# Patient Record
Sex: MALE | Race: BLACK OR AFRICAN AMERICAN | Employment: UNEMPLOYED | URBAN - METROPOLITAN AREA
[De-identification: names, ages, dates, MRNs, and addresses within clinical notes are randomized per-mention and may not be internally consistent; named-entity substitution may affect disease eponyms.]

---

## 2020-12-07 ENCOUNTER — HOSPITAL ENCOUNTER (INPATIENT)
Age: 25
LOS: 13 days | Discharge: HOME | DRG: 885 | End: 2020-12-21
Attending: INTERNAL MEDICINE | Admitting: PSYCHIATRY & NEUROLOGY

## 2020-12-07 DIAGNOSIS — F20.9 SCHIZOPHRENIA, UNSPECIFIED TYPE (HCC): Primary | ICD-10-CM

## 2020-12-07 LAB
ALBUMIN SERPL-MCNC: 3.4 G/DL (ref 3.5–5)
ALBUMIN/GLOB SERPL: 0.9 {RATIO} (ref 1.1–2.2)
ALP SERPL-CCNC: 93 U/L (ref 45–117)
ALT SERPL-CCNC: 24 U/L (ref 12–78)
ANION GAP SERPL CALC-SCNC: 5 MMOL/L (ref 5–15)
AST SERPL W P-5'-P-CCNC: 20 U/L (ref 15–37)
BASOPHILS # BLD: 0 K/UL (ref 0–0.1)
BASOPHILS NFR BLD: 1 % (ref 0–1)
BILIRUB SERPL-MCNC: 0.3 MG/DL (ref 0.2–1)
BUN SERPL-MCNC: 13 MG/DL (ref 6–20)
BUN/CREAT SERPL: 13 (ref 12–20)
CA-I BLD-MCNC: 8.7 MG/DL (ref 8.5–10.1)
CHLORIDE SERPL-SCNC: 109 MMOL/L (ref 97–108)
CO2 SERPL-SCNC: 28 MMOL/L (ref 21–32)
CREAT SERPL-MCNC: 0.98 MG/DL (ref 0.7–1.3)
DATE LAST DOSE: ABNORMAL
DIFFERENTIAL METHOD BLD: ABNORMAL
EOSINOPHIL # BLD: 0.2 K/UL (ref 0–0.4)
EOSINOPHIL NFR BLD: 4 % (ref 0–7)
ERYTHROCYTE [DISTWIDTH] IN BLOOD BY AUTOMATED COUNT: 11.8 % (ref 11.5–14.5)
GLOBULIN SER CALC-MCNC: 3.7 G/DL (ref 2–4)
GLUCOSE SERPL-MCNC: 106 MG/DL (ref 65–100)
HCT VFR BLD AUTO: 38.3 % (ref 36.6–50.3)
HGB BLD-MCNC: 12.8 G/DL (ref 12.1–17)
IMM GRANULOCYTES # BLD AUTO: 0 K/UL (ref 0–0.04)
IMM GRANULOCYTES NFR BLD AUTO: 0 % (ref 0–0.5)
LYMPHOCYTES # BLD: 2 K/UL (ref 0.8–3.5)
LYMPHOCYTES NFR BLD: 40 % (ref 12–49)
MCH RBC QN AUTO: 31 PG (ref 26–34)
MCHC RBC AUTO-ENTMCNC: 33.4 G/DL (ref 30–36.5)
MCV RBC AUTO: 92.7 FL (ref 80–99)
MONOCYTES # BLD: 0.2 K/UL (ref 0–1)
MONOCYTES NFR BLD: 3 % (ref 5–13)
NEUTS SEG # BLD: 2.6 K/UL (ref 1.8–8)
NEUTS SEG NFR BLD: 52 % (ref 32–75)
PLATELET # BLD AUTO: 291 K/UL (ref 150–400)
PMV BLD AUTO: 10.3 FL (ref 8.9–12.9)
POTASSIUM SERPL-SCNC: 3.8 MMOL/L (ref 3.5–5.1)
PROT SERPL-MCNC: 7.1 G/DL (ref 6.4–8.2)
RBC # BLD AUTO: 4.13 M/UL (ref 4.1–5.7)
REPORTED DOSE,DOSE: ABNORMAL UNITS
SALICYLATES SERPL-MCNC: 2.4 MG/DL (ref 2.8–20)
SODIUM SERPL-SCNC: 142 MMOL/L (ref 136–145)
WBC # BLD AUTO: 4.9 K/UL (ref 4.1–11.1)

## 2020-12-07 PROCEDURE — 36415 COLL VENOUS BLD VENIPUNCTURE: CPT

## 2020-12-07 PROCEDURE — 99285 EMERGENCY DEPT VISIT HI MDM: CPT

## 2020-12-07 PROCEDURE — 80053 COMPREHEN METABOLIC PANEL: CPT

## 2020-12-07 PROCEDURE — 80307 DRUG TEST PRSMV CHEM ANLYZR: CPT

## 2020-12-07 PROCEDURE — 85025 COMPLETE CBC W/AUTO DIFF WBC: CPT

## 2020-12-07 RX ORDER — RISPERIDONE 0.25 MG/1
TABLET, FILM COATED ORAL
COMMUNITY
End: 2020-12-21

## 2020-12-07 NOTE — ED PROVIDER NOTES
EMERGENCY DEPARTMENT HISTORY AND PHYSICAL EXAM      Date: 12/7/2020  Patient Name: Bryant Sykes    History of Presenting Illness     Chief Complaint   Patient presents with    Mental Health Problem       History Provided By: Patient    HPI: Bryant Sykes, 31 y.o. male with a past medical history significant for schizophrenia and autism who presents to the ED via police for a mental health evaluation. Patient is homeless and was asleep on the streets and a  picked him up and brought him to the ED for mental health evaluation. Patient states that he is originally from [facility] and has been traveling with the goal of getting to [place] to find a job in [employer]. He takes Risperdal IM as well as p.o. and states he has been compliant with IM but not the p.o. medication since he left [place]. Currently with no complaints. Denies any SI, HI, depression, anxiety, chest pain, shortness of breath, fever, chills, nausea, vomiting or diarrhea. There are no other complaints, changes, or physical findings at this time. PCP: None    No current facility-administered medications on file prior to encounter. Current Outpatient Medications on File Prior to Encounter   Medication Sig Dispense Refill    risperiDONE (RisperDAL) 0.25 mg tablet Take  by mouth. Past History     Past Medical History:  No past medical history on file. Past Surgical History:  No past surgical history on file. Family History:  No family history on file. Social History:  Social History     Tobacco Use    Smoking status: Current Every Day Smoker    Smokeless tobacco: Never Used   Substance Use Topics    Alcohol use: Not on file    Drug use: Not on file       Allergies:  No Known Allergies      Review of Systems     Review of Systems   Constitutional: Negative for chills, fatigue and fever. HENT: Negative.     Respiratory: Negative for cough, chest tightness, shortness of breath and wheezing. Cardiovascular: Negative for chest pain and palpitations. Gastrointestinal: Negative for abdominal pain, diarrhea, nausea and vomiting. Genitourinary: Negative for frequency and urgency. Musculoskeletal: Negative for back pain, neck pain and neck stiffness. Skin: Negative for rash. Neurological: Negative for dizziness, weakness, light-headedness and headaches. Psychiatric/Behavioral: Negative. Negative for behavioral problems, confusion, self-injury, sleep disturbance and suicidal ideas. The patient is not nervous/anxious. All other systems reviewed and are negative. Physical Exam     Physical Exam  Vitals signs and nursing note reviewed. Constitutional:       General: He is not in acute distress. Appearance: Normal appearance. He is well-developed. He is not ill-appearing, toxic-appearing or diaphoretic. HENT:      Head: Normocephalic and atraumatic. Nose: Nose normal. No congestion or rhinorrhea. Mouth/Throat:      Mouth: Mucous membranes are moist.      Pharynx: Oropharynx is clear. No oropharyngeal exudate or posterior oropharyngeal erythema. Eyes:      General: No scleral icterus. Conjunctiva/sclera: Conjunctivae normal.      Pupils: Pupils are equal, round, and reactive to light. Neck:      Musculoskeletal: Normal range of motion and neck supple. No neck rigidity or muscular tenderness. Cardiovascular:      Rate and Rhythm: Normal rate and regular rhythm. Pulses:           Radial pulses are 2+ on the right side and 2+ on the left side. Dorsalis pedis pulses are 2+ on the right side and 2+ on the left side. Heart sounds: No murmur. No friction rub. No gallop. Pulmonary:      Effort: Pulmonary effort is normal. No tachypnea, accessory muscle usage, respiratory distress or retractions. Breath sounds: Normal breath sounds. No stridor. No decreased breath sounds, wheezing, rhonchi or rales.    Chest:      Chest wall: No tenderness. Abdominal:      General: Bowel sounds are normal. There is no distension. Palpations: Abdomen is soft. There is no mass. Tenderness: There is no abdominal tenderness. There is no right CVA tenderness, left CVA tenderness, guarding or rebound. Musculoskeletal: Normal range of motion. General: No deformity. Right lower leg: No edema. Left lower leg: No edema. Skin:     General: Skin is warm and dry. Capillary Refill: Capillary refill takes less than 2 seconds. Coloration: Skin is not jaundiced or pale. Findings: No bruising, erythema or rash. Neurological:      General: No focal deficit present. Mental Status: He is alert and oriented to person, place, and time. Mental status is at baseline. Sensory: Sensation is intact. Motor: Motor function is intact. Psychiatric:         Mood and Affect: Affect is flat. Behavior: Behavior is withdrawn. Behavior is cooperative. Thought Content: Thought content is not paranoid or delusional. Thought content does not include homicidal or suicidal ideation. Thought content does not include homicidal or suicidal plan. Comments: Avoids eye contact         Lab and Diagnostic Study Results     Labs -   No results found for this or any previous visit (from the past 12 hour(s)). Radiologic Studies - none    Medical Decision Making   - I am the first provider for this patient. - I reviewed the vital signs, available nursing notes, past medical history, past surgical history, family history and social history. - Initial assessment performed. The patients presenting problems have been discussed, and they are in agreement with the care plan formulated and outlined with them. I have encouraged them to ask questions as they arise throughout their visit. Vital Signs-Reviewed the patient's vital signs.   Patient Vitals for the past 12 hrs:   Temp Pulse Resp BP SpO2   12/07/20 1233 98.6 °F (37 °C) 63 16 (!) 144/94 98 %       Records Reviewed: Nursing Notes    The patient presents with mental health evaluation with a differential diagnosis of homelessness, schizophrenia, medication noncompliance      ED Course:     ED Course as of Dec 07 1553   Mon Dec 07, 2020   1549 Mental health is seen and evaluated patient. Determined that patient is exhibiting very bizarre behavior and he will be admitted for evaluation of his schizophrenia. [NO]      ED Course User Index  [NO] Xuan Galvan PA       Provider Notes (Medical Decision Making):     MDM  Number of Diagnoses or Management Options  Schizophrenia, unspecified type [facility]):   Diagnosis management comments:     58-year-old male brought in by police for mental health evaluation. Patient has no specific complaints. However, throughout ED course he exhibits very bizarre behavior and thought content. Behavioral health evaluated patient and stated patient meets criteria for admission for his schizophrenia. Patient agreeable with plan of care       Amount and/or Complexity of Data Reviewed  Clinical lab tests: ordered and reviewed  Review and summarize past medical records: yes  Discuss the patient with other providers: yes    Patient Progress  Patient progress: stable             Disposition   Disposition: Admitted to [address] [place] [address] at [facility]           Diagnosis     Clinical Impression:   1. Schizophrenia, unspecified type [facility])        Attestations:    THOMAS Drake    Please note that this dictation was completed with [employer], the computer voice recognition software. Quite often unanticipated grammatical, syntax, homophones, and other interpretive errors are inadvertently transcribed by the computer software. Please disregard these errors. Please excuse any errors that have escaped final proofreading. Thank you.
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
## 2020-12-07 NOTE — BSMART NOTE
Pt assessed face to face in ED #16. Pt presented to ED after [employer] dropped him off. Pt presents in green gown, malodorous. Pt faced away from writer during assessment, did not make any eye contact. Writer observed Pt's eyes as if he was following something around the room although Pt denied VH. Pt was A&Ox2. Pt's speech was slow, clear. Pt's thought content was bizarre w/ delusions. Pt denied SI. HI. Pt said \"[place]\" to writer when writer entered room and writer told Pt it wasn't Abel yes but Pt responded that [facility] day is [place] for him. Pt reports the police dropped him off for a check up. Pt reports the Police were concerned because he had been sleeping to [facility]. Pt is homeless and transient from [place]. Pt reported he was travelling to [place] to other staff but when writer asked where he was going he said [place] and only verified he was going to [place] after he was asked about previous statements. Pt reports he has been \"sleeping a long time\". Pt reports a hx of schizophrenia, reports hx of taking Risperdal. Pt presented endorsed AH saying he always hears the voices. Pt denies the voices are command in nature and when asked to describe them Pt presented as bizarre and described them as \"like the rapture\" or \"gone in 61 seconds\". Pt reported decrease in appetite. Pt denies substance use. Pt endorsed racing thoughts. Pt reports he believes he was \"reincarnated\" after balking out after smoking K2 a few years ago. Pt could verify his , age was off by a year, he knew he was in a hospital in [place] and reported that the year was . Pt reports his last [facility] admission was a couple years ago. Pt has no current OP tx. Pt denies any support system and stated \"I always walk out\" and that \"life is boring\" because there are \"no neat drugs around\". At this time Pt reports he is vol for tx.     Writer consulted w/ Miles Hui who recommends [facility] admission due to Pt presenting as bizarre w/ delusions, disoriented and belief that Pt is unable to keep himself safe at this time due to [address]. Pt accepted to 2S pending medical clearance / bed availability. ED notified. Covid test needed.

## 2020-12-07 NOTE — ED NOTES
Pt AOx2. States he knows hes in a hospital but was unsure of the name. Pt states that he is from [place], [place]. And he is on his way to [place] to find work. Pt denies SI/HI. Pt states that he does hear voices. Pt states that his mom passed away not to long ago. Pt states that he hears loud voices all the time but they are not telling him to do anything. Pt appeared confused when asked if he was on any medications. Pt stated that he \"feels like he \". When I asked the pt to explain what he meant, he stated \"you know, Im just trying to live you know? \" Pt stated that he was sleeping and the police were called because he wanted to get further to [place]. Pt was placed in a green gown, personal items were removed. No loose wires in room. Trash bins removed. Cabinets were zip tied. Pt was given a lunch tray. Will cont to monitor pt at this time.  was notified for evaluation.

## 2020-12-08 PROBLEM — F20.9 SCHIZOPHRENIA (HCC): Status: ACTIVE | Noted: 2020-12-08

## 2020-12-08 LAB
AMPHET UR QL SCN: NEGATIVE
APAP SERPL-MCNC: <10 UG/ML (ref 10–30)
APPEARANCE UR: CLEAR
BACTERIA URNS QL MICRO: NEGATIVE /HPF
BARBITURATES UR QL SCN: NEGATIVE
BENZODIAZ UR QL: NEGATIVE
BILIRUB UR QL: NEGATIVE
CANNABINOIDS UR QL SCN: NEGATIVE
CAOX CRY URNS QL MICRO: NORMAL
COCAINE UR QL SCN: NEGATIVE
COLOR UR: NORMAL
DRUG SCRN COMMENT,DRGCM: NORMAL
GLUCOSE UR STRIP.AUTO-MCNC: NEGATIVE MG/DL
HGB UR QL STRIP: NEGATIVE
KETONES UR QL STRIP.AUTO: NEGATIVE MG/DL
LEUKOCYTE ESTERASE UR QL STRIP.AUTO: NEGATIVE
METHADONE UR QL: NEGATIVE
MUCOUS THREADS URNS QL MICRO: NORMAL /LPF
NITRITE UR QL STRIP.AUTO: NEGATIVE
OPIATES UR QL: NEGATIVE
PCP UR QL: NEGATIVE
PH UR STRIP: 5 [PH] (ref 5–8)
PROT UR STRIP-MCNC: NEGATIVE MG/DL
RBC #/AREA URNS HPF: NORMAL /HPF (ref 0–5)
SARS-COV-2, COV2: NORMAL
SARS-COV-2, COV2: NOT DETECTED
SP GR UR REFRACTOMETRY: 1.03 (ref 1–1.03)
SPECIMEN SOURCE: NORMAL
UA: UC IF INDICATED,UAUC: NORMAL
UROBILINOGEN UR QL STRIP.AUTO: 0.1 EU/DL (ref 0.1–1)
WBC URNS QL MICRO: NORMAL /HPF (ref 0–4)

## 2020-12-08 PROCEDURE — 87635 SARS-COV-2 COVID-19 AMP PRB: CPT

## 2020-12-08 PROCEDURE — 80307 DRUG TEST PRSMV CHEM ANLYZR: CPT

## 2020-12-08 PROCEDURE — 65220000003 HC RM SEMIPRIVATE PSYCH

## 2020-12-08 PROCEDURE — 36415 COLL VENOUS BLD VENIPUNCTURE: CPT

## 2020-12-08 PROCEDURE — 81001 URINALYSIS AUTO W/SCOPE: CPT

## 2020-12-08 PROCEDURE — 74011250637 HC RX REV CODE- 250/637: Performed by: PSYCHIATRY & NEUROLOGY

## 2020-12-08 RX ORDER — DIPHENHYDRAMINE HYDROCHLORIDE 50 MG/ML
50 INJECTION, SOLUTION INTRAMUSCULAR; INTRAVENOUS
Status: DISCONTINUED | OUTPATIENT
Start: 2020-12-08 | End: 2020-12-21 | Stop reason: HOSPADM

## 2020-12-08 RX ORDER — BENZTROPINE MESYLATE 1 MG/1
1 TABLET ORAL
Status: DISCONTINUED | OUTPATIENT
Start: 2020-12-08 | End: 2020-12-21 | Stop reason: HOSPADM

## 2020-12-08 RX ORDER — HYDROXYZINE 50 MG/1
50 TABLET, FILM COATED ORAL
Status: DISCONTINUED | OUTPATIENT
Start: 2020-12-08 | End: 2020-12-21 | Stop reason: HOSPADM

## 2020-12-08 RX ORDER — RISPERIDONE 2 MG/1
2 TABLET, FILM COATED ORAL
Status: DISCONTINUED | OUTPATIENT
Start: 2020-12-08 | End: 2020-12-21 | Stop reason: HOSPADM

## 2020-12-08 RX ORDER — OLANZAPINE 5 MG/1
5 TABLET ORAL
Status: DISCONTINUED | OUTPATIENT
Start: 2020-12-08 | End: 2020-12-21 | Stop reason: HOSPADM

## 2020-12-08 RX ORDER — ACETAMINOPHEN 500 MG
500 TABLET ORAL
Status: DISCONTINUED | OUTPATIENT
Start: 2020-12-08 | End: 2020-12-09

## 2020-12-08 RX ORDER — HYDROXYZINE PAMOATE 25 MG/1
25 CAPSULE ORAL
Status: DISCONTINUED | OUTPATIENT
Start: 2020-12-08 | End: 2020-12-21 | Stop reason: HOSPADM

## 2020-12-08 RX ORDER — HALOPERIDOL 5 MG/ML
5 INJECTION INTRAMUSCULAR
Status: DISCONTINUED | OUTPATIENT
Start: 2020-12-08 | End: 2020-12-21 | Stop reason: HOSPADM

## 2020-12-08 RX ORDER — TRAZODONE HYDROCHLORIDE 50 MG/1
50 TABLET ORAL
Status: DISCONTINUED | OUTPATIENT
Start: 2020-12-08 | End: 2020-12-21 | Stop reason: HOSPADM

## 2020-12-08 RX ORDER — ACETAMINOPHEN 325 MG/1
650 TABLET ORAL
Status: DISCONTINUED | OUTPATIENT
Start: 2020-12-08 | End: 2020-12-21 | Stop reason: HOSPADM

## 2020-12-08 RX ORDER — LORAZEPAM 2 MG/ML
1 INJECTION INTRAMUSCULAR
Status: DISCONTINUED | OUTPATIENT
Start: 2020-12-08 | End: 2020-12-21 | Stop reason: HOSPADM

## 2020-12-08 RX ORDER — ADHESIVE BANDAGE
30 BANDAGE TOPICAL DAILY PRN
Status: DISCONTINUED | OUTPATIENT
Start: 2020-12-08 | End: 2020-12-21 | Stop reason: HOSPADM

## 2020-12-08 RX ADMIN — TRAZODONE HYDROCHLORIDE 50 MG: 50 TABLET ORAL at 22:00

## 2020-12-08 RX ADMIN — RISPERIDONE 2 MG: 2 TABLET ORAL at 22:00

## 2020-12-08 NOTE — ROUTINE PROCESS
TRANSFER - OUT REPORT:    Verbal report given to Rom Shirley on Eduar Gallagher  being transferred to (unit) for routine progression of care       Report consisted of patients Situation, Background, Assessment and   Recommendations(SBAR). Information from the following report(s) SBAR, Kardex, ED Summary, Procedure Summary, Intake/Output, Accordion and Recent Results was reviewed with the receiving nurse. Lines:       Opportunity for questions and clarification was provided.       Patient transported with:   Mobile Event Guide

## 2020-12-08 NOTE — BSMART NOTE
Pt was seen laying on the floor with blanket covered up. Pt has not had any behavioral issues on shift. Pt has been called and cooperative with staff. Awaiting Covid results for [facility] admission.

## 2020-12-08 NOTE — BSMART NOTE
Writer checked in on Pt in ED #23. Pt was lying in bed. Pt said he slept okay last night. Pt deneid SI, AVH. Pt voiced no complaints at this time. Pt was pre-screened by D19 last night but will remain voluntary.  Pt waiting transfer to  pending covid test.

## 2020-12-08 NOTE — ED NOTES
Patient left room, walking towards waiting room doors. Asked patient where he was going, and patient states \"to smoke a cigarette\". Advised patient that smoking is not allowed on the property. Patient states \"Oh, okay\" and is easily redirectable back to his room. Asked patient to remove his clothing from under his gown due to our policies. Patient states \"No, I'm okay. I am cold. \"  Told patient that he has a gown on that would be covering and offered him warm blankets; patient still refused. Asked patient to pull his pockets out of his shorts to show if anything in pockets. Patient complied and nothing is in his pockets. Also advised patient that a urine specimen and COVID swab is needed for further lab work for admission. Patient states, \"No, I want to go. I want to leave. She said she would be coming back from [facility] \"  Patient also mumbling words that is unable to be understood. Patient very cooperative and pleasant. Notified Fletcher Root.

## 2020-12-08 NOTE — BH NOTES
Patient arrived on floor. Patient was irritated. Patient was asked to do a safety search per unit policy. Patient refused search. Patient became irritated. Code SREEDHAR was called at 08 [phone]. Patient complied with search when security and additional staff arrived. Will continue to be available.

## 2020-12-08 NOTE — BSMART NOTE
Writer was contacted by Tejas Stevenson and notified that pt was trying to leave the ER. She reported that she redirected pt back to the Room. She reported that pt is still asking if he can go home. Writer went and spoke with pt. Writer asked pt why he was asking to leave. Pt shared that he does not want to be hospitalized reporting that he only wanted to get medication and leave. Writer explained to pt that the psychiatrist reported concerns about his mental health and was asking that pt stay and get IP treatment due to safety concerns. Writer reported that if pt wants to leave, then D19 will be notified to evaluate further due to the psychiatrist recommending IP treatment. Pt agreed to talk to D19.

## 2020-12-08 NOTE — BSMART NOTE
Pt Update. Writer notified by ED that PT was requesting to leave. Writer spoke w/ Pt. Pt presenting w/ delusions and bizarre thought content. Pt said that he's \"Not a  flying above the world\". Pt stated at one point that he wanted Risperdal and then a few minutes later Pt said he would rather drink \"swamp water\" and that \"this earth isn't for him\". Writer offered Pt puzzles, word searches, paper and pencil to occupy his time which Pt refused and said he likes to be outside. Pt left his ED room and Rad Cheng was called. Pt willingly walked back to sit in chair outside his room. Pt presents as confused and continues to ask writer the same question over and over again, forgetting what was just said to him. Pt asking for discharge, Writer explained to Pt multiple times he cannot leave ED at this time. Writer contacted D19 requesting TDO due to Pt asking to leave and attempting to. Pt cannot tell writer or staff today date or what year it is. Pt said he \"doesn't keep up w/ stuff like that\" and only repeated todays year w/ looking at his wrist band. Pt then reported he is a time traveler and trying to get to 2012 which he said hasn't happened yet. Pt endorsed AH saying voices tell him things such as \"just go for it\" and \"go jump off a jp\".

## 2020-12-08 NOTE — ED NOTES
Pt came to nurse's desk stating he \"felt better\" and was \"ready to leave. \" This RN tried to redirect Pt to room but Pt said the \"room is hot. \" Pt started wandering near registration desk. Pt was delusional thoughts such as stating \"I want the ocean to adopt me. \" This RN called Code SREEDHAR. Security was able to redirect Pt to bedroom and to sit in chair. Will continue to monitor Pt.

## 2020-12-09 PROCEDURE — 65220000003 HC RM SEMIPRIVATE PSYCH

## 2020-12-09 PROCEDURE — 74011250637 HC RX REV CODE- 250/637: Performed by: PSYCHIATRY & NEUROLOGY

## 2020-12-09 RX ADMIN — RISPERIDONE 2 MG: 2 TABLET ORAL at 21:01

## 2020-12-09 RX ADMIN — TRAZODONE HYDROCHLORIDE 50 MG: 50 TABLET ORAL at 21:02

## 2020-12-09 NOTE — GROUP NOTE
[facility] GROUP DOCUMENTATION INDIVIDUAL Group Therapy Note Date: 12/9/2020 Group Start Time: 1100 Group End Time: 3167 Group Topic: Education Group - Inpatient [facility] 
 
[facility] GROUP DOCUMENTATION GROUP Group Therapy Note Attendees: Patients used time to create their own safety plan. Patients encouraged to brain storm warning signs, coping strategies, safe places, and safe people in their life together. Patients encouraged to support each other, share about their coping strategies in particular. Patients encouraged to discuss how they can use their safety plans to be effective. Attendance: Did not attend Patient's Goal:  n/a Interventions/techniques: n/a Follows Directions: n/a Interactions: n/a Mental Status: n/a Behavior/appearance: n/a 
 
Goals Achieved: n/a Additional Notes:  Pt encouraged but did not attend group. Lucille Boone

## 2020-12-09 NOTE — GROUP NOTE
[facility] GROUP DOCUMENTATION INDIVIDUAL Group Therapy Note Date: 12/9/2020 Group Start Time: 1300 Group End Time: 3614 Group Topic: Process Group - Inpatient [facility] Pratik WOODY 
 
[facility] GROUP DOCUMENTATION GROUP Group Therapy Note Attendees: Patients used group time to discuss their situations, how they came to the hospital, their feelings, and their goals. Patients encouraged to share and give feedback to their peers. Patients used time to process their feelings. Themes surrounding family, trauma, and self care were discussed. Attendance: Did not attend Patient's Goal:  n/a Interventions/techniques: n/a Follows Directions: n/a Interactions: n/a Mental Status: n/a Behavior/appearance: n/a 
 
Goals Achieved: n/a Additional Notes:  Pt encouraged but did not attend group. Brook[place] Rule
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
## 2020-12-09 NOTE — H&P
[address] [facility] HISTORY AND PHYSICAL    Name:  Janie Weaver  MR#:  [id number]  :  1995  ACCOUNT #:  [de-identified]  ADMIT DATE:  2020    DATE SEEN:  2020    This is a 50-year-old, single,  male patient whom I had seen in consultation in ED yesterday on 2020. Once COVID negative, he was transferred to the behavioral health unit. CHIEF COMPLAINT:  Sleeping in the street behind the electric transformer in a sleeping bag. Police asked him, brought him to the hospital.    HISTORY OF PRESENT ILLNESS:  The patient from [place]. He says he was bored. He was traveling, came to this area looking for a job as a . He gives a history of schizophrenia, taking some medication, sounds like Risperdal, which he is not taking. Not seeing anybody. He has been here for a few days. Denies suicidal thought. Hallucinating but there is no command hallucination. Appetite okay. Sleep decreased. No support system in this area. Trying to get disability. PAST PSYCHIATRIC HISTORY:  One hospitalization, I believe, within last year. PAST MEDICAL HISTORY:  Denied any medical history. One time, he said he had in the past frostbite, but did not leave any residual.  He fully understands he can have frostbite, but he says he was in a good sleeping bag. ALLERGIES TO MEDICATIONS:  NONE. CURRENT MEDICATIONS:  None. FAMILY HISTORY:  Unknown. He told in the ED that his parents are living, but when I asked today, they both are . He says he is the youngest of the children. Other family members, siblings can be helpful and supportive. SOCIAL HISTORY:  The patient denies any alcohol abuse, but smokes five cigarettes a day. Denied drug abuse. TRAUMA HISTORY:  None. MENTAL STATUS:  Average height, medium built, fairly nourished male patient. Polite, cooperative, a little bit disheveled, but verbal, articulate.   Very good chronological history. Denies suicidal thought or homicidal thought. Does acknowledge voices. No command hallucination. During the interview, he was jerky, looking around all of a sudden as if he heard or seen. No agitation, no aggression, and he thanked me for seeing and trying to take care of him. Memory recall is fair. Insight limited. Of course, judgment has been poor by putting himself in risky situation. DIAGNOSES:  Schizophrenia, acute exacerbation. Nicotine abuse. DISPOSITION:  The patient needs inpatient level of care, close observation, medical consult, 15-minute checks. Placed him on an antipsychotic medication - Risperdal and leave p.r.n. trazodone, Vistaril, Tylenol, Mylanta. LENGTH OF STAY:  5-7 days. CRITERIA FOR DISCHARGE:  Free of hallucination. Stable neurovegetative functioning, appropriate disposition planning to not put himself at risk. Try to reach out the family if we can, try to see if they can provide assistance and support. Otherwise, he may have to go to a shelter or crisis stabilization.       Shadia Benson MD      [id number]  D:  12/09/2020 12:44  T:  12/09/2020 14:39  JOB #:  [id number]

## 2020-12-09 NOTE — BH NOTES
Release of Information     Pt has signed a release of information for:     [address]: [phone]

## 2020-12-09 NOTE — BH NOTES
Received a call from female who stated she is pt's sister, Jona Nogueira. Sister stated she received a VM from /[facility] stating that pt is here. Sister stated pt has MI and the family has not seen him in over a year. Sister tearful and stated she is happy to know that pt is safe. Sister left rita number for a return call. U/s informed sister without a passcode U/s is unable to verify or deny whether pt is here. Sister left number at [phone].

## 2020-12-09 NOTE — BH NOTES
Patient presents as flat but cooperative and pleasant. He is interested in his hearing on Friday. He has been up socializing with select peers. He makes his needs known. Remains on close observation.

## 2020-12-09 NOTE — H&P
[address] [facility] HISTORY AND PHYSICAL    Name:  Jace Woodall  MR#:  [id number]  :  1995  ACCOUNT #:  [de-identified]  ADMIT DATE:  2020    This is a 79-year-old single  male patient admitted to the [address] [facility] voluntarily from [facility] ED, where he was brought by the [facility] Department sleeping on the street, later converted into a TDO. The patient's case was consulted on the telephone yesterday, and this morning virtual assessment with TeleMed iPad with the help of intake. CHIEF COMPLAINT:  \"I was sleeping on the street, police brought me;\" told him to come here. HISTORY OF PRESENT ILLNESS:  The patient says he is from [place]. He was , left the family. One version is he is  going to [place] and another version is he is going to [place]. In any event, he was sleeping on the street * this brought him here. He says this was around 3 in the morning. The patient did acknowledge having the diagnosis of schizophrenia, autism, taking Risperdal 25 mg injection. The patient acknowledged hearing voices, but no command hallucinations, nonspecific. Denies suicidal thought, homicidal thought. He says he was just sleeping, he did not say anything. He does not need to be here. He does not need to take medications. PAST HISTORY:  The patient had a prior psychiatric treatment in [place] and took antipsychotic medication per his primary care doctor for schizophrenia. TRAUMA HISTORY:  Denied. FAMILY HISTORY:  Denied. SUBSTANCE ABUSE HISTORY:  Denied alcohol abuse, and he says he smokes five cigarettes a day. Drugs, denied it. Appetite is okay. Sleep fluctuates. 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 He is a little bit untidy, disheveled. Memory recall is fair. IQ about average. Insight is limited. Judgment is poor by history, fair by testing.     DIAGNOSES:  Schizophrenia, schizoaffective disorder. DISPOSITION:  The patient needs an inpatient level of care, as heat the risk of elements, hallucinations. Not getting his psychiatric treatment, and he is under TDO. Close observation, medical consult. Individual therapy, group therapy. Placed him on risperidone 2 mg at night, p.r.n. Tylenol, Mylanta, Vistaril, trazodone, and Geodon 20 mg b.i.d. p.r.n. Apparently, he was reluctant to get inspection done, security check done. was called, he was cooperative. LENGTH OF STAY:  Seven to ten days. CRITERIA FOR DISCHARGE:  Free of hallucinations, engaged, and work with the staff to have realistic and safe travel plans or safe housing plans and followup.         Mariela Youssef MD      [id number]  D:  12/09/2020 0:50  T:  12/09/2020 4:34  JOB #:  [id number]

## 2020-12-09 NOTE — BH NOTES
Admission Note    Patient was admitted to [address] [place] [address] on 12/08/2020 on day-shift shortly before second shift arrived. Patient was medically cleared in the [address] [place] [address] ER. TDO Admission. Patient was admitted to the services of Dr. Imani Harvey MD with Schizophrenia. Per report, upon being informed that he was TDOed and being searched patient become uncooperative and a Code SREEDHAR was called. Patient declined to complete the Nursing Assessment; he also presented as confused. Patient is a 14-year-old, homeless, -American male. Per paperwork, patient has been transient traveling from [place], [place]. Patient was trying to make it to [place] so he could look for a job in [employer]. Per chart, patient has no family support. Patient has been sleeping in front of stores. Patient was discovered by police sitting in his car acting bizarrely. Patient presents as disoriented, confused, tangential, paranoid. Patient has conveyed hearing voices telling him to \"sometimes jump off a bridge. Patient has a history of being on Risperdal injection buy has been off of his medication. Patient has stated he is a time traveler trying to get to 2012. Patient has conveyed that he is reincarnated. History of schizophrenia, autism. NKA. Per report, negative drug screen.

## 2020-12-09 NOTE — H&P
[address] [facility] HISTORY AND PHYSICAL    Name:  Radha Alexander  MR#:  [id number]  :  1995  ACCOUNT #:  [de-identified]  ADMIT DATE:  2020    INDICATION:  This is a 18-year-old  male patient admitted to emergency department, brought by the [place]. I was asked to do the consultation. Presented to the ED with the F 15, stating that he is homeless and was asleep on the streets when a  saw him, picked him up, and brought him to the ED for mental health evaluation. The patient denied suicidal or homicidal ideation, depression. Stated that to me he left his parents' house, who live in [facility] when he graduated high school. He never went back. Denies any and all abuse when he was growing up. States he suffers from boredom and has been diagnosed with schizophrenia and autism. CHIEF COMPLAINT:  \"I have got a sleeping bag; sleeping next to an electric transformer, behind it; and [employer] picked him up, brought him here. \"  Felt nothing wrong with him. HISTORY OF PRESENT ILLNESS:  The patient says he had one admission to a psychiatric hospital last year. He told that his parents are living, he does not have anything to do with them. Denies suicidal thoughts, homicidal thoughts. No prior suicidal or homicidal attempt. Told Intake; hearing voices, but no command hallucinations. He could not tell me what medication he was taking. TRAUMA HISTORY:  Denied it. FAMILY HISTORY:  Denied it. ALCOHOL ABUSE:  Denied it. RECREATIONAL HISTORY:  Denied drug abuse. Smokes five cigarettes a day. His appetite is decreased and sleep decreased. Energy, motivation low. States applied for disability, but not getting yet. Does some  work here and there. Then, says he might have taken Risperdal.    MEDICAL HISTORY:  No medical problem.     ALLERGIES TO MEDICATIONS:  NO KNOWN MEDICATION PROBLEM. MENTAL STATUS:  The patient seen via TeleMed iPad with Intake staff helping. The patient is alert, verbal, felt he should not be here, there is nothing wrong with him even though he acknowledged hallucination, being homeless, sleeping in the streets. He does understand he can have a frostbite. One time, he almost had a frostbite, but cleared up. During the interview, he appeared to be as if he is responding, looking around suspiciously in a jerky way. Denies suicidal thoughts or homicidal thoughts. Memory recall is fair. IQ about average. Insight poor. Judgment is definitely poor. DIAGNOSES:  Schizophrenia, chronic, under pressure type with acute exacerbation. Nicotine abuse. DISPOSITION:  Waiting for COVID clearance. Once the COVID is cleared, he can be transferred to the [address] [place] [address] Unit. Obviously, he is homeless. Hopefully, he will allow us to talk to his family and get some assistance. LENGTH OF STAY:  7 to 10 days. Follow up with the local [facility].       Zully Baig MD      [id number]  D:  12/09/2020 12:38  T:  12/09/2020 16:13  JOB #:  [id number]

## 2020-12-10 PROCEDURE — 65220000003 HC RM SEMIPRIVATE PSYCH

## 2020-12-10 PROCEDURE — 74011250637 HC RX REV CODE- 250/637: Performed by: PSYCHIATRY & NEUROLOGY

## 2020-12-10 RX ADMIN — RISPERIDONE 2 MG: 2 TABLET ORAL at 21:15

## 2020-12-10 RX ADMIN — TRAZODONE HYDROCHLORIDE 50 MG: 50 TABLET ORAL at 21:15

## 2020-12-10 NOTE — PROGRESS NOTES
Progress Note  Date:12/10/2020       Room:[facility]  Patient Name:Silvino Sykes     YOB: 1995     Age:25 y.o. Subjective    Subjective   Review of Systems  Objective         Vitals Last 24 Hours:  TEMPERATURE:  Temp  Av.8 °F (36.6 °C)  Min: 97.4 °F (36.3 °C)  Max: 98.2 °F (36.8 °C)  RESPIRATIONS RANGE: Resp  Av  Min: 18  Max: 18  PULSE OXIMETRY RANGE: No data recorded  PULSE RANGE: Pulse  Av.5  Min: 68  Max: 103  BLOOD PRESSURE RANGE: Systolic (23VQK), KWD:074 , Min:118 , QTY:072   ; Diastolic (25DPB), KIT:27, Min:58, Max:74    I/O (24Hr): No intake or output data in the 24 hours ending 12/10/20 1508  Objective  Labs/Imaging/Diagnostics    Labs:  CBC:  Recent Labs     20  1800   WBC 4.9   RBC 4.13   HGB 12.8   HCT 38.3   MCV 92.7   RDW 11.8        CHEMISTRIES:  Recent Labs     20  1800      K 3.8   *   CO2 28   BUN 13   CA 8.7   PT/INR:No results for input(s): INR, INREXT in the last 72 hours. No lab exists for component: PROTIME  APTT:No results for input(s): APTT in the last 72 hours. LIVER PROFILE:  Recent Labs     20  1800   AST 20   ALT 24     Lab Results   Component Value Date/Time    ALT (SGPT) 24 2020 06:00 PM    AST (SGOT) 20 2020 06:00 PM    Alk. phosphatase 93 2020 06:00 PM    Bilirubin, total 0.3 2020 06:00 PM       Imaging Last 24 Hours:  No results found.   Assessment//Plan   Active Problems:    Schizophrenia ([address] (2020)      Assessment & Plan    Electronically signed by Chioma Kidd MD on 12/10/2020 at 3:08 PM

## 2020-12-10 NOTE — BH NOTES
Pt.is up and visible on unit,affect is flat, appetite good, appearance is disheveled,pt. remains in inappropriate layering of his clothes. pt.denies feeling suicidal, mood remains depressed. pt.does admitt to hearing voices. Pt. is in dayroom at times but no interaction observed with peers. insight and judgement is limited. remains on close observation.

## 2020-12-10 NOTE — GROUP NOTE
[facility] GROUP DOCUMENTATION INDIVIDUAL Group Therapy Note Date: 12/10/2020 Group Start Time: 1300 Group End Time: 3569 Group Topic: Process Group - Inpatient [facility] OP Sebas WOODY 
 
[facility] GROUP DOCUMENTATION GROUP Group Therapy Note Attendees: Patients used time to discuss how they came to the hospital, their current situations, and their feelings related to those situations. Patients encouraged to support peers, reflect and process their emotions. Themes surrounding boundaries, family, and trust were discussed. Attendance: Did not attend Patient's Goal:  n/a Interventions/techniques: n/a Follows Directions: n/a Interactions: n/a Mental Status: n/a Behavior/appearance: n/a 
 
Goals Achieved: n/a Additional Notes:  Pt encouraged but did not attend group. Myla Brambila

## 2020-12-10 NOTE — PROGRESS NOTES
Problem: Falls - Risk of  Goal: *Absence of Falls  Description: Document Alysia Maier Fall Risk and appropriate interventions in the flowsheet.   Outcome: Progressing Towards Goal  Note: Fall Risk Interventions:                                Problem: Psychosis  Goal: *STG: Decreased hallucinations  Outcome: Progressing Towards Goal  Goal: *STG: Remains safe in hospital  Outcome: Progressing Towards Goal  Goal: *STG: Participates in individual and group therapy  Outcome: Progressing Towards Goal  Goal: *STG: Develop safety plan for times when triggered in stressful situations  Outcome: Progressing Towards Goal

## 2020-12-10 NOTE — PROGRESS NOTES
Patient alert and oriented x4. Patient denies si/hi/avh. Patient denies anxiety and depression. Patient isolated to room this shift. He has a flat affect and depressed mood. Patient is pleasant. He is medication compliant, calm and cooperative at this time. Will continue to monitor.

## 2020-12-10 NOTE — BH NOTES
Behavioral Health Treatment Team Note     Patient goal(s) for today: Patient did not provide this worker with a goal  Treatment team focus/goals: Collateral contact, medication compliance    Progress note: Patient presnted as distracted and with poor eye contact when speaking with this worker. The patient was orientated, but seemed to be thinking about other things during this meeting. This worker informed the patient that he would be speaking with his sister and will coordinate a family meeting for the following day so that that patient can speak with his sisters. LOS:  2  Expected LOS: 5-7 days    Insurance info/prescription coverage:  none  Date of last family contact:  No recent contact  Family requesting physician contact today:  no  Discharge plan: Will continue to work with patient to identify discharge plan  Guns in the home:  no   Outpatient provider(s):   Will work with patient to identify outpatient provider    Participating treatment team members: Ricky Morris, * (assigned SW), Ayden Sewell LMSW

## 2020-12-10 NOTE — BH NOTES
Patient case discussed in the treatment team event that led to the hospitalization and background information patient today bed untidy does not appear to be as much hallucinating jumpy and looking around the corner as if she is hallucinating apparently the staff staff able to reach this sister and sister he was glad to hear from the staff that the patient is safe.   Patient says that he is one of the 12 siblings he is the youngest 3 is the oldest 3is a 66-year-old brother this is his mother passed away father is someplace else he do not know he fell getting help from his siblings are okay no definite plans where he want to go except he does want to go to college do not know what he want to do in the college denies suicidal thoughts encouraged to keep up with the personal hygiene and grooming wonder he has got any extra pairs of close to where he would talk to the nursing staff and he is compliant with medications no side effects his blood pressure is 118/74 pulse 103 no agitation no aggression very polite going to the groups continued inpatient level of care indicated I wonder he also needs some housing and transportation help

## 2020-12-10 NOTE — GROUP NOTE
IP  GROUP DOCUMENTATION INDIVIDUAL Group Therapy Note Date: 12/10/2020 Group Start Time: 36 Group End Time: 1100 Group Topic: [employer] SRM 2 BH NON ACUTE Claudia Gandhi [facility] GROUP DOCUMENTATION GROUP Group Therapy Note Attendees: 7 Attendance: Did not attend Patient's Goal: Interventions/techniques: Follows Directions:  
 
Interactions:  
 
Mental Status:  
 
Behavior/appearance:  
 
Goals Achieved: Additional Notes:  Pt was encouraged to attend group but refused. [facility]

## 2020-12-10 NOTE — BH NOTES
[facility] Recreational Therapy Assessment    Orientation:  Person, Place and Situation     Reason for Admission:  Pt reported \" he was feeling depressed and having anxiety but don't know why then pt stated \"real bad bladder problem\" Pt denied SI and  A/V Hallucinations\". According to chart pt has been transient traveling from [facility] and was trying to make it to [place] and has been sleeping in front of stores and acting bizarre.  Chart indicated pt presented confuses, disoriented, tangential and paranoid and has conveyed hearing voices to sometimes jump off a bridge    Medical Precautions / Conditions:  None    Impairments:     Vision:  Wears Glasses No      Wears Contacts No      Are they with Patient n/a     Hearing Aids No     Utilization of Ambulatory Devices:  None    Health Problems Preventing Participation in Activities:  No  How:  None    Leisure Interest Checklist:  Rebecca Nickie / [employer] [facility] [address], Exercising, Fishing, Listening to Music, Reading, [employer] and love talking per pt    Does patient participate in leisure activities:  Yes    Setting:  Alone    Other Activities / Skills / Talents:  Love being outside sleeping in a tent    Do emotions interfere with leisure activities / lifestyles:  No    When engaging in leisure activities, do you forget worries:  Yes    Do you belong to a Yazdanism:  No    Are you active in Jimenez activities:  No    Typical Day:  Pt reported \"he just walk , read and drink a lot of water\"    Strengths:  Pt reported \"his sisters, brothers and the Bible\"    Limitations / Barriers:  Finances, Housing, Motivation, Depressed Mood, Anxiety, Sleep, Hallucinations and Paranoia    Treatment Modalities:  Stress Management, Coping Skills, Symptom Recognition, Healthy Thinking, Mood Management and Leisure Skills    Patient Educational  Needs:  Skills to recognize and challenge problematic thinking, Identify positive coping strategies and skills to manage symptoms or moods, Leisure education and Recognition of symptoms and signs    Focus of Treatment:  Introduce positive outlets for self expression and Introduce and encourage the exploration of alternative coping skills    Summary:  Pt was cooperative during assessment. Pt reported he live alone and travel different places and he stay in shelters. Pt reported he is not working and sometimes help people out. Pt reported he was not taking medication and not seeing any providers.

## 2020-12-11 PROCEDURE — 74011250637 HC RX REV CODE- 250/637: Performed by: PSYCHIATRY & NEUROLOGY

## 2020-12-11 PROCEDURE — 74011250637 HC RX REV CODE- 250/637: Performed by: INTERNAL MEDICINE

## 2020-12-11 PROCEDURE — 65220000003 HC RM SEMIPRIVATE PSYCH

## 2020-12-11 RX ADMIN — RISPERIDONE 2 MG: 2 TABLET ORAL at 22:03

## 2020-12-11 RX ADMIN — TRAZODONE HYDROCHLORIDE 50 MG: 50 TABLET ORAL at 22:03

## 2020-12-11 NOTE — BH NOTES
Behavioral Health Treatment Team Note     Patient goal(s) for today: 'to get an ID'  Treatment team focus/goals: Continue medication management, group therapy and conduct family session with sisters    Progress note: Pt presented with a flat affect, minimal eye contact but was pleasant and polite. Pt said he 'doesn't need' to speak to sisters and was reading his book. Pt was focused on getting an ID.  Pt was committed today for up to 10 days    LOS:  3  Expected LOS: 10    Insurance info/prescription coverage:  None at this time  Date of last family contact:  today  Family requesting physician contact today:  no  Discharge plan:  Pt and  will work together to discuss/plan this   Guns in the home:  no   Outpatient provider(s):  None at this time    Participating treatment team members: Edis Franco, * (assigned SW), YOUNG

## 2020-12-11 NOTE — GROUP NOTE
[facility] GROUP DOCUMENTATION INDIVIDUAL Group Therapy Note Date: 12/11/2020 Group Start Time: 1300 Group End Time: 6869 Group Topic: Process Group - Inpatient [facility]M CARE MANAGEMENT Irene Quevedo [facility] GROUP DOCUMENTATION GROUP Group Therapy Note Patients began group by stating what their goals were. Patients then participated in positivity ball' exercise. Patients were able to discuss items on the positivity ball, and were encouraged to give feedback to other peers. Attendees: 9 out of 15 Attendance: Did not attend Angeles Garcia

## 2020-12-11 NOTE — GROUP NOTE
[facility]  GROUP DOCUMENTATION INDIVIDUAL Group Therapy Note Date: 12/11/2020 Group Start Time: 1100 Group End Time: 1452 Group Topic: Education Group - Inpatient SRM CARE MANAGEMENT Mary Alisa LEE  GROUP DOCUMENTATION GROUP Group Therapy Note Patients participated in a psycho education group about defense mechanisms. Patients answered check in question by introducing themselves and describing their moods. Patients then went over defense mechanisms worksheet with the therapist.   
 
Attendees: 9 out of 15 Attendance: Did not attend Kit King

## 2020-12-11 NOTE — GROUP NOTE
IP  GROUP DOCUMENTATION INDIVIDUAL Group Therapy Note Date: 12/11/2020 Group Start Time: 8059 Group End Time: 1010 Group Topic: [employer] SRM 2 BH NON ACUTE Estle Jimmy IP  GROUP DOCUMENTATION GROUP Group Therapy Note Attendees: 9 Attendance: Attended Patient's Goal:  Stay Positive Interventions/techniques: Supported Follows Directions: Followed directions Interactions: Interacted appropriately Mental Status: Other Overwhelmed Behavior/appearance: Cooperative Goals Achieved: Identified feelings Additional Notes:   
 
Yunior Power

## 2020-12-11 NOTE — BH NOTES
Patient is assessed as a moderate lethality risk. He denies any SI/HI/AVH. Patient is seculsive to himself in his room. He needed encouragement to attend groups this morning. He is redirectable. Patient did become more to himself when he was TDO for 10days. Patient stated that he feels he doesn't need any treatment, he just needs a [employer] ID, and Social security card. Patient was encouraged to shower but refused at the moment.

## 2020-12-12 PROCEDURE — 74011250637 HC RX REV CODE- 250/637: Performed by: PSYCHIATRY & NEUROLOGY

## 2020-12-12 PROCEDURE — 65220000003 HC RM SEMIPRIVATE PSYCH

## 2020-12-12 RX ADMIN — TRAZODONE HYDROCHLORIDE 50 MG: 50 TABLET ORAL at 21:27

## 2020-12-12 RX ADMIN — RISPERIDONE 2 MG: 2 TABLET ORAL at 21:27

## 2020-12-12 NOTE — BH NOTES
Patient case discussed in the treatment team and patient cooperative compliant with medication patient able to talk to his sister face-to-face virtually apparently yesterday and today he got some extra close clean, polite still delusional guarded but appeared to be less perturbed by visual hallucinations auditory hallucination he is not all of a sudden darting trying to listen and see something and his blood pressure 129/76 no side effect at the time of hearing sought 10-day commitment hearing he also needs some housing some resources no aggression no agitation noted mostly isolate himself occasionally goes to AA some selective groups

## 2020-12-12 NOTE — PROGRESS NOTES
Problem: Falls - Risk of  Goal: *Absence of Falls  Description: Document Janice Gan Fall Risk and appropriate interventions in the flowsheet.   Outcome: Progressing Towards Goal  Note: Fall Risk Interventions:     Problem: Psychosis  Goal: *STG: Decreased hallucinations  Outcome: Progressing Towards Goal  Goal: *STG: Decreased delusional thinking  Outcome: Progressing Towards Goal  Goal: *STG: Remains safe in hospital  Outcome: Progressing Towards Goal  Goal: *STG/LTG: Complies with medication therapy  Outcome: Progressing Towards Goal     Problem: *Psychosis: Discharge Outcome  Goal: *Absent or Controlled Active Psychotic Symptoms  Outcome: Progressing Towards Goal

## 2020-12-12 NOTE — BH NOTES
Mr. Claude Radford was noted to be pleasant, calm and cooperative. Spent most of the time in his room. Denies SI/HI/Anxiety. Depression. Took medications and snack. No concerns expressed. Q 15 mins checks done for safety.

## 2020-12-12 NOTE — BH NOTES
Pt.is up and visible on unit, affect is flat, appetite good, appearance is neat, denies feeling suicidal,mood is a little less depressed. pt. isolates to room a lot, compliant with medications ,pleasant upon approach, remains guarded,suspicious, no aggression or agitated behaviors at present,remains on close observation.

## 2020-12-12 NOTE — GROUP NOTE
[facility] GROUP DOCUMENTATION INDIVIDUAL Group Therapy Note Date: 12/12/2020 Group Start Time: 1100 Group End Time: 7870 Group Topic: Process Group - Inpatient [facility] Maria De Jesus Benson R 
 
[facility] GROUP DOCUMENTATION GROUP Group Therapy Note Attendees: Patients encouraged to use time to discuss their situations, how they came to the hospital, and how they are feeling. Patients used time to give each other feedback and process their emotions. Themes surrounding family, grief, and frustration when people do not understand mental health arose and were discussed. Attendance: Did not attend Patient's Goal:  n/a Interventions/techniques: n/a Follows Directions: n/a Interactions: n/a Mental Status: n/a Behavior/appearance: n/a 
 
Goals Achieved: n/a Additional Notes:  Pt encouraged but did not attend group. Harvey Woodward

## 2020-12-13 PROCEDURE — 74011250637 HC RX REV CODE- 250/637: Performed by: PSYCHIATRY & NEUROLOGY

## 2020-12-13 PROCEDURE — 65220000003 HC RM SEMIPRIVATE PSYCH

## 2020-12-13 RX ADMIN — RISPERIDONE 2 MG: 2 TABLET ORAL at 21:56

## 2020-12-13 RX ADMIN — TRAZODONE HYDROCHLORIDE 50 MG: 50 TABLET ORAL at 21:56

## 2020-12-13 NOTE — BH NOTES
Pt. Is up and visible on unit, affect is flat, appetite good, appearance is disheveled, denies feeling suicidal, limited insight into issues,denies hallucinations although appears to have internal stimilis going on,isolates to room, paranoid, guarded,suspicious, no other concerns voiced, remains on close observation.

## 2020-12-13 NOTE — BH NOTES
Behavioral Health Treatment Team Note     Patient goal(s) for today: Discharge coordination  Treatment team focus/goals: Discharge coordination    Progress note: Patient was guarded when speaking with this worker but confirmed that he continues to be open to discharging with his sister from [place] when discharge is coordinated    LOS:  5  Expected LOS: 7 days    Insurance info/prescription coverage:  none  Date of last family contact:  11/11/2020  Family requesting physician contact today:  no  Discharge plan: To sisters home in [facility]  Guns in the home:  no   Outpatient provider(s):   Will work with patient and sister to determine    Participating treatment team members: Gloria Sykes, * (assigned SW), Kim Meier LMSW

## 2020-12-13 NOTE — GROUP NOTE
IP  GROUP DOCUMENTATION INDIVIDUAL Group Therapy Note Date: 12/13/2020 Group Start Time: 1100 Group End Time: 1200 Group Topic: Process Group - Inpatient [facility] 2 BH NON ACUTE [facility] GROUP DOCUMENTATION GROUP Group Therapy Note Patient's were encouraged to participate in group \"quote of the day\" check in activity. Patient were encouraged to discuss events leading to hospitalization, experience at [facility], and goals with peers. Patient were encouraged to provide peers with feedback. A disrupted patient entered the room with a foul odor and the patients acknowledged the odor and the patient's excused her-self from group. Group members were encouraged to process the experience. Attendees: 7/13 Attendance: Did not attend Patient's Goal:  N/A Interventions/techniques: N/A Follows Directions: N/A Interactions: N/A Mental Status: N/A Behavior/appearance: N/A Goals Achieved: N/A Additional Notes:  Patient was encouraged but did not attend group Adelina Cardona

## 2020-12-13 NOTE — BH NOTES
Pt during the evening in room remaining in room. Pt observed having conversations with self and appearing preoccupied and did  deny hallucinations or  Internal stimulation even  Though observed as so. Pleasant on approach and polite in interactions with staff, guarded and avoidant in general.  No observed interacting with peers. Medication compliant and no negative effects from medications  observed or reported. No management issues on the unit. Continue 15 minute checks to maintain pt safety.

## 2020-12-14 PROCEDURE — 74011250637 HC RX REV CODE- 250/637: Performed by: PSYCHIATRY & NEUROLOGY

## 2020-12-14 PROCEDURE — 65220000003 HC RM SEMIPRIVATE PSYCH

## 2020-12-14 RX ORDER — ARIPIPRAZOLE 5 MG/1
5 TABLET ORAL DAILY
Status: DISCONTINUED | OUTPATIENT
Start: 2020-12-15 | End: 2020-12-21 | Stop reason: HOSPADM

## 2020-12-14 RX ADMIN — RISPERIDONE 2 MG: 2 TABLET ORAL at 21:22

## 2020-12-14 RX ADMIN — TRAZODONE HYDROCHLORIDE 50 MG: 50 TABLET ORAL at 21:22

## 2020-12-14 NOTE — GROUP NOTE
[facility] GROUP DOCUMENTATION INDIVIDUAL Group Therapy Note Date: 12/14/2020 Group Start Time: 1100 Group End Time: 0947 Group Topic: Process Group - Inpatient [facility] Group Rom Ceja [facility] GROUP DOCUMENTATION GROUP Group Therapy Note Attendees: 6 Process Group: Pts were asked to share a goal as a check in question. Pts were then encouraged to share what brought them to the hospital, share any resources they know of and provide feedback to peers when appropriate. Pts were then asked to reflect on group Attendance: Did not attend Additional Notes:  Pt was encouraged to attend but chose not to do so [facility]

## 2020-12-14 NOTE — PROGRESS NOTES
Discussed case interviewed patient  Patient is a 76-year-old male admitted to psychiatry unit December 7 under care of Dr. Laverne Anguiano.   Taking and tolerating medications well  He is currently on risperidone 2 mg at nighttime and trazodone 50 mg at nighttime  At times continues to have paranoid ideations and seems preoccupied  Could be observed talking to himself  Ongoing acute psychotic symptoms  Generally guarded and avoidant but talks polite in interactions with staff  Pledges for safety on the unit no overt aggression  Positive hearing voices positive talking to himself and preoccupied    Mental status exam  Alert oriented fair eye contact  Speech is goal-directed short answers  Affect is guarded avoidant mood is okay  Positive hearing voices positive preoccupied  Insight and judgment fair  Thought process mostly linear and logical    Recommendations  Continue inpatient treatments  He may benefit from higher dose of risperidone  Follow-up with psychiatric team again tomorrow

## 2020-12-14 NOTE — BH NOTES
Pt during the course of the evening most of time in room  awake. Minimal interactions among others on the unit. Medication compliant andd no negative effects from medication observed or reported. Pleasant on approach. Observed at times talking to  Self and interacting as if responding to internal stimulation, Pt denies these concerns. No management issues on the unit. Continue 15 minute checks to maintain pt safety.

## 2020-12-14 NOTE — BH NOTES
Patient up to the dayroom for meals, ate 100%. Presently eating his dinner in his room. Calm & polite on approach. Noted talking to self at times. During rounds, patient was observed in his room, masterbating. Patient stopped this behavior when staff entered his room. No attendance to groups. Denies SI/HI. Denies AVH. No physical complaints voiced. Remains on CO. Continue to monitor.

## 2020-12-15 PROCEDURE — 65220000003 HC RM SEMIPRIVATE PSYCH

## 2020-12-15 PROCEDURE — 74011250637 HC RX REV CODE- 250/637: Performed by: PSYCHIATRY & NEUROLOGY

## 2020-12-15 RX ADMIN — RISPERIDONE 2 MG: 2 TABLET ORAL at 21:08

## 2020-12-15 RX ADMIN — TRAZODONE HYDROCHLORIDE 50 MG: 50 TABLET ORAL at 21:08

## 2020-12-15 RX ADMIN — ARIPIPRAZOLE 5 MG: 5 TABLET ORAL at 08:44

## 2020-12-15 NOTE — BH NOTES
Patient case discussed in the treatment room this morning weekend events overall behavior progress apparently able to contact the sister in [place] apparently she intends to drive down here and take him home they are very happy to hear about him. Patient wearing pants and close personalizing grooming has improved alert verbal flat affect appears to be less hallucinating not as much still staff noted talking to himself. He apparently occasionally goes to the day room but eats in his room added Abilify to help with not only psychosis but depression apparently his mother  some 12 years ago his behavior has changed he did not want to talk much about also that around that time he was also using some spine as spice.   She would get a bit more better outcome from adding Abilify nevertheless we will consider giving either Abilify or long-acting or Risperdal long-acting to for better compliance and outcome will reassess the case tomorrow his vital signs today blood pressure 120s over 74 pulse 92 and is not agitated not aggressive polite but flat affect he is willing to go back with his sister to [place] thank you

## 2020-12-15 NOTE — BH NOTES
Collateral Contact  This worker spoke with patient's sister Jona Nogueira. This worker provided an update for [employer] regarding her brother's presentation and treatment. Shanda informed this worker that when speaking to the patient it doesn't seem that the patient wants to return to [place] with her and her brother. This worker spoke to [employer] about providing treatment in [place] this workers suspicious that the patient will [facility] walk about of coordinated treatment and continue to walk to [place]. Shanda and this worker will continue to discuss recommendation for patient treatment and well as coordinating another family meeting.

## 2020-12-15 NOTE — GROUP NOTE
[facility] GROUP DOCUMENTATION INDIVIDUAL Group Therapy Note Date: 12/15/2020 Group Start Time: 1300 Group End Time: 5481 Group Topic: Process Group - Inpatient [facility] Rowdy WOODY 
 
[facility] GROUP DOCUMENTATION GROUP Group Therapy Note Attendees: Patients used time to discuss their situations, how they came to the hospital and to process feelings. Patients encouraged to give peer support and to process their feelings. Themes surround frustration, funmi, and self control arose. Attendance: Did not attend Patient's Goal:  n/a Interventions/techniques: n/a Follows Directions: n/a Interactions: n/a Mental Status: n/a Behavior/appearance: n/a 
 
Goals Achieved: n/a Additional Notes:  Pt encouraged but did not attend group. Oleg Cox

## 2020-12-15 NOTE — BH NOTES
Behavioral Health Treatment Team Note     Patient goal(s) for today: Get ID, plan for discharge  Treatment team focus/goals: Discharge coordination, caring for personal hygiene, encouragement to participate in treatment    Progress note: patient was in bed when this worker approached to discuss discharge, treatment, and his sister's support. The patient was short with answers but aware of the conversation. This worker encouraged the patient to take care of his hygiene as the room was malodorous and the patient presented disheveled. LOS:  7  Expected LOS: 7 days    Insurance info/prescription coverage:  none  Date of last family contact:  11/14/2020  Family requesting physician contact today:  no  Discharge plan: To sisters home in [facility]  Guns in the home:  no   Outpatient provider(s):   Will work with patient and sister to determine    Participating treatment team members: Mariela Sykes, * (assigned SW), Fiorella Montez LMSW

## 2020-12-15 NOTE — BH NOTES
Patient case discussed in the treatment team this morning patient continued doing well even though still isolate himself personal hygiene and grooming improving but can still improve polite calm and some rambling noted says in the past year to go Haldol Decano[employer]. Here when asked to have hobbies interests what make him happy he rambled about him [place].   And will go is is tolerating Abilify if the pharmacy have Abilify already started will give her long-acting injection hopefully not only improve with his psychosis better compliance and to help with the mood and energy apparently his sister want to come and get him in the long run will continued inpatient level of care indicated reassess the case tomorrow his blood pressure 117/80 pulse 66 side effects noted no agitation or aggression not suicidal not homicidal

## 2020-12-15 NOTE — BH NOTES
Pt. received in his room alert and oriented to name and place only. Pt. is isolative to his room. He was encouraged to come out and ambulate. Pt. denies si/hi/avh/anxiety and depression. He is pleasant,calm,cooperative,appropriate to most of his responses and with blunted affect. He appears to be preoccupied. He's disheveled and encouraged to take a shower . Pt. is compliant to his scheduled medication. Safety checks continue q 15 minutes. Supportive care given. Precaution maintained.

## 2020-12-15 NOTE — BH NOTES
Patient has been withdrawn to his room, in bed. Out for snacks. He alert & oriented X 4. He's cooperative on approach. He denies depression, anxiety, SI, HI, AH and VH. No pain. He on close observation for safety.

## 2020-12-16 PROCEDURE — 74011250637 HC RX REV CODE- 250/637: Performed by: PSYCHIATRY & NEUROLOGY

## 2020-12-16 PROCEDURE — 65220000003 HC RM SEMIPRIVATE PSYCH

## 2020-12-16 RX ADMIN — RISPERIDONE 2 MG: 2 TABLET ORAL at 21:08

## 2020-12-16 RX ADMIN — TRAZODONE HYDROCHLORIDE 50 MG: 50 TABLET ORAL at 21:08

## 2020-12-16 RX ADMIN — ARIPIPRAZOLE 5 MG: 5 TABLET ORAL at 08:53

## 2020-12-16 NOTE — BH NOTES
Pt during this shift in room in bed awake and napping at times. Medication compliant no negative effects from medication observed or reported. Pt napping, avoidant of others. Affect flat and situational brightening on approach. Pt denies  Suicidal ideation. No management issues on the unit. Continue 15 minute checks to maintain pt safety.

## 2020-12-16 NOTE — GROUP NOTE
[facility] GROUP DOCUMENTATION INDIVIDUAL Group Therapy Note Date: 12/16/2020 Group Start Time: 1100 Group End Time: 9923 Group Topic: Education Group - Inpatient [facility] Group Annamarie Mcfadden [facility] GROUP DOCUMENTATION GROUP Group Therapy Note Attendees: 2 Safety Planning: pts were encouraged to share how they are feeling. Pts were then walked through the safety planning worksheet and then participated in a basic stretching/breathing exercise if they felt comfortable doing so and asked to reflect on group Attendance: Attended Patient's Goal:  Pt reported feeling 'good' Interventions/techniques: Validated, Promoted peer support, Provide feedback and Supported Follows Directions: Followed directions Interactions: Interacted appropriately Mental Status: Congruent and Flat Behavior/appearance: Attentive, Motivated, Neatly groomed and Withdrawn/quiet Goals Achieved: Able to engage in interactions and Able to listen to others Additional Notes:  Pt spoke about beatboxing, going on walks and breathing as his coping skills. Pt filled out most of his safety planned and also spoke about reading. Pt said that he liked the stretching and breathing. Pt is making progress towards goals by attending and participating in group [facility]

## 2020-12-16 NOTE — BH NOTES
Patient is assessed as a moderate lethality risk. Patient denies SI/HI. Patient is in his bedroom responding to internal stimuli. Patient was encouraged to attend  groups, which he did attend one. Patient was encouraged to shower multiple times today. Patient was given clean clothing, he went into his shower room but did not shower. Patient is compliant with his medications. He is pleasant. He has an adequate appetite, and drinking fluids. He does not socialize with his peers. Support provided. Encouraged patient to utilize positive coping skills.

## 2020-12-16 NOTE — BH NOTES
Behavioral Health Treatment Team Note     Patient goal(s) for today: \"Read a book\"  Treatment team focus/goals: Discharge coordination, caring for personal hygiene, encouragement to participate in treatment    Progress note:Patient was in bed when this worker met him at [phone]. Patient continues to present with disorientation to situation, not discussing treatment or recent history of walking from [place], but expressing concern about his identification. This worker as well as patient's nurse encouraged the patient to care for his hygiene today by showering. The patient agreed but stated that he does not have clothes. This worker informed the patient that his clothing has been washed and he would be provided a gown when he showers until he has his clothings. LOS:  8  Expected LOS: 7 days    Insurance info/prescription coverage:  none  Date of last family contact:  11/14/2020  Family requesting physician contact today:  no  Discharge plan: To sisters home in [facility]  Guns in the home:  no   Outpatient provider(s):   Will work with patient and sister to determine    Participating treatment team members: Nicole Sykes, * (assigned SW), Lawernce Keepers JANEE

## 2020-12-16 NOTE — PROGRESS NOTES
[place] was invited [place] [facility] [place] and chose not to attend.

## 2020-12-17 PROCEDURE — 74011250636 HC RX REV CODE- 250/636: Performed by: PSYCHIATRY & NEUROLOGY

## 2020-12-17 PROCEDURE — 65220000003 HC RM SEMIPRIVATE PSYCH

## 2020-12-17 PROCEDURE — 74011250637 HC RX REV CODE- 250/637: Performed by: PSYCHIATRY & NEUROLOGY

## 2020-12-17 RX ADMIN — TRAZODONE HYDROCHLORIDE 50 MG: 50 TABLET ORAL at 21:16

## 2020-12-17 RX ADMIN — RISPERIDONE 2 MG: 2 TABLET ORAL at 21:16

## 2020-12-17 RX ADMIN — ARIPIPRAZOLE 5 MG: 5 TABLET ORAL at 08:19

## 2020-12-17 RX ADMIN — RISPERIDONE 25 MG: KIT at 09:14

## 2020-12-17 NOTE — GROUP NOTE
[facility] GROUP DOCUMENTATION INDIVIDUAL Group Therapy Note Date: 12/16/2020 Group Start Time: 2000 Group End Time: 2045 Group Topic: Recreational/Music Therapy SRM 2  NON ACUTE Maria R Benjamin [facility] GROUP DOCUMENTATION GROUP Group Therapy Note Attendees: 4 Introduce healthy leisure task skill group to improve mood and manage stress. Attendance: Attended Patient's Goal: STG/LTG: Demonstrates improved thought patterns as evidenced by logical and coherent speech Interventions/techniques: Art integration and Supported Follows Directions: Followed directions Interactions: Interacted appropriately Mental Status: Calm and Flat Behavior/appearance: Attentive and Cooperative Goals Achieved: Able to engage in interactions and Able to listen to others Additional Notes: Attended group. Accepted leisure activity packet. Worked on art task. Was receptive to intervention. Listened to songs played during group Verbalized it was relaxing to listen to music. Verbalized he enjoyed sitting in group listening to the music. Used leisure time constructively.   
 
Cayetano Stone, CTRS

## 2020-12-17 NOTE — GROUP NOTE
[facility]  GROUP DOCUMENTATION INDIVIDUAL Group Therapy Note Date: 12/17/2020 Group Start Time: 3102 Group End Time: 9830 Group Topic: [employer] SRM 2 BEHA HLTH ACUTE Angelique Sep 
 
[facility] GROUP DOCUMENTATION GROUP Group Therapy Note Attendees: 
 
  
 
Attendance: Attended Patient's Goal:  Patient stated mood is mellow, goal is to read a book, stated depression 0, anxious 0, homicidal 0, suicidal 0. Interventions/techniques: Supported Follows Directions: Followed directions Interactions: Interacted appropriately Mental Status: Flat Behavior/appearance:  
 
Goals Achieved: Able to engage in interactions Additional Notes:  
Elmer Loera

## 2020-12-17 NOTE — BH NOTES
This worker spoke with patient's sister regarding discharge plan. This worker suggested Sunday for  because that is the final day of commitment. Patient [facility]. Nancy Paige is also concerned that he would not be able to be cared for while in [place]. Shanda requested medical records when she comes to get him.

## 2020-12-17 NOTE — GROUP NOTE
IP  GROUP DOCUMENTATION INDIVIDUAL Group Therapy Note Date: 12/17/2020 Group Start Time: 2295 Group End Time: 1001 Group Topic: Recreational/Music Therapy SRM 2  NON ACUTE Sonia Jim 
 
IP  GROUP DOCUMENTATION GROUP Group Therapy Note Facilitated leisure skills group to reinforce positive coping through music, group activities, social interaction and art tasks Attendees: 5 Attendance: Attended Patient's Goal:  Attend group daily Interventions/techniques: Art integration and Supported Follows Directions: Followed directions Interactions: Interacted appropriately Mental Status: Calm Behavior/appearance: Cooperative Goals Achieved: Able to engage in interactions and Able to listen to others Additional Notes:  Receptive to listening to music and songs he selected while working on art task Aissatou Chavez

## 2020-12-17 NOTE — GROUP NOTE
[facility] GROUP DOCUMENTATION INDIVIDUAL Group Therapy Note Date: 12/17/2020 Group Start Time: 1100 Group End Time: 1200 Group Topic: Education Group - Inpatient SRM 2  NON ACUTE Radha Hawkins [facility] GROUP DOCUMENTATION GROUP Group Therapy Note  facilitated a discussion on diaphragmatic breathing. This  discussed the benefits of relaxation techniques to include guided imagery and deep breathing or diaphragmatic breathing. Attendees: 3 out of 6 Attendance: Attended Patient's Goal:  Participate in education activity. Interventions/techniques: Informed, Provide feedback and Supported Follows Directions: Followed directions Interactions: Disorganized interaction Mental Status: Calm and Congruent Behavior/appearance: Attentive, Disheveled, Grooming impaired, Needed prompting and Withdrawn/quiet Goals Achieved: Able to listen to others, Able to give feedback to another, Able to manage/cope with feelings, Able to receive feedback and Discussed coping Additional Notes:  Patient attended group with minimal participation. He appeared withdrawn but was calm throughout the group. He shared that he uses deep breathing to stay calm or relaxed. He was prompted to engage in the deep breathing exercise but did not. Destini Vasquez

## 2020-12-17 NOTE — BH NOTES
Behavioral Health Treatment Team Note     Patient goal(s) for today: \"Read a book or do crossword puzzles\"  Treatment team focus/goals: Discharge coordination, caring for personal hygiene, encouragement to participate in treatment    Progress note:Patient was encouraged to care for his personal hygiene. The patient agreed and nodded along, but has done the same with past encouragement and did not care for his hygiene. The patient presented disheveled, with flat affect and pressured speech. The patient informed this worker that he has been given his injection medication. Patient participated in group and reported that he will continue group participation. This worker will schedule conversation with patient sister to coordinate discharge potentially for this Sunday. LOS:  9  Expected LOS: 11 days    Insurance info/prescription coverage:  none  Date of last family contact:  11/14/2020  Family requesting physician contact today:  no  Discharge plan: To sisters home in [facility] in the home:  no   Outpatient provider(s):   Will work with patient and sister to determine    Participating treatment team members: Soraya Sykes, * (assigned SW), Jessica Dubois LMSW

## 2020-12-17 NOTE — BH NOTES
Patient case discussed in the treatment team this morning and his personal hygiene is poor but grooming improving staff encouraged him to wash where appropriate close and is willing he started getting out of his room not isolating walking in the hallway and sometimes greeting attending some groups there is improvement in activity level engagement. Abilify 5 mg seems to be helping apparently the sister want to drive down and take him will let her know whenever he is ready we will continue to observe progress he made promises he made to make some more changes in personal hygiene grooming and the stability of the progress.   Continued inpatient level of care indicated his blood pressure today 122/72 pulse 71 no complaints we will go ahead and administer Risperdal Consta 25 mg tomorrow he is tolerating the oral medications and no side effects

## 2020-12-17 NOTE — GROUP NOTE
[facility] GROUP DOCUMENTATION INDIVIDUAL Group Therapy Note Date: 12/17/2020 Group Start Time: 1300 Group End Time: 6501 Group Topic: Process Group - Inpatient [facility] OP Estela Deluca R 
 
[facility] GROUP DOCUMENTATION GROUP Group Therapy Note Attendees: Patients encouraged to share about how they came to the hospital, their current situations, and their emotions related to their situations. Patient encouraged to process their feelings and give feedback to peers. Themes surrounding moving on and dealing with hearing voices emerged and were discussed. Attendance: Attended Patient's Goal:  Participates in individual and group therapy Interventions/techniques: Validated, Promoted peer support, Reinforced and Supported Follows Directions: Followed directions Interactions: Disorganized interaction Mental Status: Blunted, Calm and Restricted Behavior/appearance: Attentive, Cooperative, Needed prompting and Withdrawn/quiet Goals Achieved: Able to engage in interactions, Able to listen to others, Able to give feedback to another, Able to reflect/comment on own behavior, Able to manage/cope with feelings, Able to receive feedback, Able to experience relief/decrease in symptoms, Able to self-disclose, Discussed coping and Identified feelings Additional Notes:  Pt attended group and was engaged. Pt thought process somewhat disorganized. Pt talking about super heroes, the rapture, how he had passed away and came back. Pt polite and able to respond appropriately at first then will go off tangentially about things that do not make sense. Yunior Greenwood

## 2020-12-17 NOTE — BH NOTES
Patient is assessed as a low lethality risk. Patient  Denies any lethality. Patient was encouraged to attend groups which he did. Encouraged patient to perform hygiene care. Patient is pleasant and cooperative. He is compliant with his medications. Patient received Risperdal contsa today no concerns. Patient is seculsive to himself, does not socialize with peers. He has an adequate appetite and drinking fluids.

## 2020-12-18 PROCEDURE — 65220000003 HC RM SEMIPRIVATE PSYCH

## 2020-12-18 PROCEDURE — 74011250637 HC RX REV CODE- 250/637: Performed by: PSYCHIATRY & NEUROLOGY

## 2020-12-18 PROCEDURE — 74011250637 HC RX REV CODE- 250/637: Performed by: INTERNAL MEDICINE

## 2020-12-18 RX ADMIN — RISPERIDONE 2 MG: 2 TABLET ORAL at 21:25

## 2020-12-18 RX ADMIN — ARIPIPRAZOLE 5 MG: 5 TABLET ORAL at 08:52

## 2020-12-18 RX ADMIN — TRAZODONE HYDROCHLORIDE 50 MG: 50 TABLET ORAL at 21:25

## 2020-12-18 NOTE — BH NOTES
Patient case discussed in the treatment team patient polite cooperative walking in the hallway polite and does not appear to be actively hallucinating but remains rather passive apathetic his sister is planning to come and pick him up on this coming Sunday continued inpatient level of care indicated he will be traveling home with his sister and is supportive environment will get resources and follow-up thank you side effect no agitation no aggression not suicidal homicidal

## 2020-12-18 NOTE — BH NOTES
Behavioral Health Treatment Team Note     Patient goal(s) for today: \"Read a book or do crossword puzzles\"  Treatment team focus/goals: Discharge coordination, caring for personal hygiene, encouragement to participate in treatment    Progress note: Patient presented with torres range in affect this morning during conversation and was more open about discharge plan to sisters home as well as experience at [facility]. The patient continues to request a book to read and hopes discharge soon. LOS:  10  Expected LOS: 11 days    Insurance info/prescription coverage:  none  Date of last family contact:  11/14/2020  Family requesting physician contact today:  no  Discharge plan: To sisters home in [facility]  Guns in the home:  no   Outpatient provider(s):   Will work with patient and sister to determine    Participating treatment team members: Soraya Sykes, * (assigned SW), Jessica Dubois LMSW

## 2020-12-18 NOTE — GROUP NOTE
[facility] GROUP DOCUMENTATION INDIVIDUAL Group Therapy Note Date: 12/18/2020 Group Start Time: 1300 Group End Time: 3953 Group Topic: Process Group - Inpatient [facility] OP Beryl Jimenez R 
 
[facility] GROUP DOCUMENTATION GROUP Group Therapy Note Attendees: Patients used time to process their situations, how they came to the hospital, and their feelings related to their situations. Patients encouraged to share and give each other feedback. Themes surrounding funmi, anxiety, and forgiveness were discussed. Attendance: Attended Patient's Goal:  Participates in individual and group therapy Interventions/techniques: Validated, Promoted peer support, Reinforced and Supported Follows Directions: Followed directions Interactions: Interacted appropriately Mental Status: Calm, Congruent and Flat Behavior/appearance: Attentive, Cooperative, Needed prompting and Withdrawn/quiet Goals Achieved: Able to engage in interactions, Able to listen to others, Able to give feedback to another and Able to self-disclose Additional Notes:  Pt attended group and was engaged. Pt reported that he is \"feeling great\" today. Pt was withdrawn and quiet throughout most of group. Wilbur Kill

## 2020-12-18 NOTE — BH NOTES
Patient is assessed as a low lethality risk. Patient denies any lethality. He is pleasant and cooperative with staff. He is seculsive to himself. Patient is attending groups. He is compliant with his medications. Patient has an adequate appetite, and drinking fluids. Support provided. Encouraged patient to utilize positive coping skills.

## 2020-12-18 NOTE — BH NOTES
Patient case discussed with the team early case and also nursing staff and patient did receive his Risperdal no side effects patient mood better energy better and attitude better personal hygiene is grooming better getting out of the bed much always polite.   Apparently the sister intend to come to take him home, home come to visit him from him drive down here and take him back on this coming Sunday and is receptive to the and his blood pressure 110/69 pulse 69 no no side effects continued inpatient level of care indicated does not appear to be hallucinating no internal stimuli

## 2020-12-18 NOTE — GROUP NOTE
[facility]  GROUP DOCUMENTATION INDIVIDUAL Group Therapy Note Date: 12/18/2020 Group Start Time: 1100 Group End Time: 0048 Group Topic: Education Group - Inpatient [facility] CARE MANAGEMENT Trevor Bonilla [facility] GROUP DOCUMENTATION GROUP Group Therapy Note Patients participated in psycho education group therapy focused on automatic negative thoughts. Patients discussed how they were feeling today and then were lead in a group discussion about automatic negative thoughts. Attendees: 4/8 Attendance: Attended Patient's Goal:  Patient working towards goal of attending group therapy. Interventions/techniques: Informed Follows Directions: Followed directions Interactions: Interacted appropriately Mental Status: Congruent and Delusions Behavior/appearance: Attentive and Cooperative Goals Achieved: Able to engage in interactions, Able to listen to others, Able to give feedback to another, Able to reflect/comment on own behavior and Able to receive feedback Additional Notes:  Patient participated in group therapy. Patient was delusional at times speaking about his ability to read peoples minds. Patient was able to participate at times however still presented with delusional thoughts. Particia Hipps

## 2020-12-18 NOTE — GROUP NOTE
[facility]  GROUP DOCUMENTATION INDIVIDUAL Group Therapy Note Date: 12/18/2020 Group Start Time: 4302 Group End Time: 1057 Group Topic: Recreational/Music Therapy SRM 2  NON ACUTE Arabic Mayer 
 
[facility] GROUP DOCUMENTATION GROUP Group Therapy Note Facilitated leisure skills group to reinforce positive coping through music, group activities, social interaction and art tasks Attendees: 6 Attendance: Attended Patient's Goal:  Attend group daily Interventions/techniques: Art integration and Supported Follows Directions: Followed directions Interactions: Interacted appropriately Mental Status: Calm Behavior/appearance: Cooperative Goals Achieved: Able to engage in interactions and Able to listen to others Additional Notes:  Receptive to listening to music and songs he selected while working on art task and word puzzle [facility]

## 2020-12-18 NOTE — BH NOTES
Patient is withdrawn and isolative in his room. He's alert and oriented X 3. His thought are clearer. He is more focus. He still has pressure speech. He denies depression, anxiety, SI, HI, AH and VH. No acute distress noted. He's on close observation for safety.

## 2020-12-19 PROCEDURE — 74011250637 HC RX REV CODE- 250/637: Performed by: PSYCHIATRY & NEUROLOGY

## 2020-12-19 PROCEDURE — 65220000003 HC RM SEMIPRIVATE PSYCH

## 2020-12-19 RX ADMIN — TRAZODONE HYDROCHLORIDE 50 MG: 50 TABLET ORAL at 21:29

## 2020-12-19 RX ADMIN — RISPERIDONE 2 MG: 2 TABLET ORAL at 21:29

## 2020-12-19 RX ADMIN — ARIPIPRAZOLE 5 MG: 5 TABLET ORAL at 08:46

## 2020-12-19 NOTE — GROUP NOTE
[facility] GROUP DOCUMENTATION INDIVIDUAL Group Therapy Note Date: 12/19/2020 Group Start Time: 1100 Group End Time: 2790 Group Topic: Process Group - Inpatient [facility] OP Cuco Escobedo R 
 
[facility] GROUP DOCUMENTATION GROUP Group Therapy Note Attendees: Patients used time to discuss how they came to the hospital, their current situations, and their emotions related to their current situations. Patients encouraged to share openly and support each other. Patients encouraged to process their emotions through talk therapy, encouraged to listen and support each other. Themes surrounding funmi and PTSD/trauma were discussed. Attendance: Did not attend Patient's Goal:  n/a Interventions/techniques: n/a Follows Directions: n/a Interactions: n/a Mental Status: n/a Behavior/appearance: n/a 
 
Goals Achieved: n/a Additional Notes:  Pt encouraged but did not attend group. Kahlil Maldonado

## 2020-12-19 NOTE — BH NOTES
Mr. Sherry Lima spent most of the time in his room during this shift. No behavioral issues noted or reported. Denies SI/HI/AVH and depression and anxiety. Medication compliant. Q 15 mins checks done for safety.

## 2020-12-19 NOTE — BH NOTES
Patient has been up walking about on unit. He  Reports that being here has helped he to be able to concentrate better. He has denied any feeling of depression, anxiety, SI or HI. He denied any hallucinations or feelings of paranoid. He has been asking when the doctor will be here. He hopes to be discharged today. He remains on close observation.

## 2020-12-19 NOTE — GROUP NOTE
[facility] GROUP DOCUMENTATION INDIVIDUAL Group Therapy Note Date: 12/19/2020 Group Start Time: 1000 Group End Time: 5046 Group Topic: Nursing SRM 2 BEHA [facility] ACUTE MANDO Lamb  GROUP DOCUMENTATION GROUP Group Therapy Note Attendees:0 Attendance: Did not attend Patient's Goal:  Medication education Interventions/techniques: Follows Directions:  
 
Interactions:  
 
Mental Status:  
 
Behavior/appearance:  
 
Goals Achieved:   
 
 
Additional Notes:  Da Fofana RN

## 2020-12-19 NOTE — BH NOTES
Patient seen along with the nursing staff discussed with the nursing staff patient in and out of the room change his clothes but has not taken a shower staff is encouraged him to take a shower and no hallucination but rambling is asking when she can he can go no place to go he said he did talk with his brother earlier he spoke with his sister she want to come and pick him up sometime he says he is he is a man from the [facility] and he is a grown man ambivalence about returning with the sister from the sister want to come and pick him up he is eating well sleeping well no agitation or aggression polite but body water still his vital signs today blood pressure 117/77 pulse is 70 no side effects

## 2020-12-19 NOTE — PROGRESS NOTES
Problem: Falls - Risk of  Goal: *Absence of Falls  Description: Document Valerie Beaveryonathan Fall Risk and appropriate interventions in the flowsheet.   Outcome: Progressing Towards Goal  Note: Fall Risk Interventions:        Problem: Psychosis  Goal: *STG: Decreased hallucinations  Outcome: Progressing Towards Goal  Goal: *STG: Decreased delusional thinking  Outcome: Progressing Towards Goal  Goal: *STG: Remains safe in hospital  Outcome: Progressing Towards Goal  Goal: *STG/LTG: Complies with medication therapy  Outcome: Progressing Towards Goal

## 2020-12-20 PROCEDURE — 65220000003 HC RM SEMIPRIVATE PSYCH

## 2020-12-20 PROCEDURE — 74011250637 HC RX REV CODE- 250/637: Performed by: PSYCHIATRY & NEUROLOGY

## 2020-12-20 RX ADMIN — ARIPIPRAZOLE 5 MG: 5 TABLET ORAL at 09:15

## 2020-12-20 RX ADMIN — TRAZODONE HYDROCHLORIDE 50 MG: 50 TABLET ORAL at 21:36

## 2020-12-20 RX ADMIN — RISPERIDONE 2 MG: 2 TABLET ORAL at 21:36

## 2020-12-20 NOTE — BH NOTES
Mr. Le is still noted to be withdrawn where he spends most of the time in his room. Denies SI/HI/AVH as well as anxiety and depression. Compliant with medications. Rested well this shift. No behavioral issues reported or observed. q 15 mins checks done for safety.

## 2020-12-20 NOTE — BH NOTES
Patient has been isolating in bed more today. He get up for meals & takes the tray back to his room to eat. He has denied any feeling of depression,anxiety, SI or HI. He has not verbalized any delusions, He denied having any hallucinations. He has remains safe . No attempts to self harm. He remain on close observation.

## 2020-12-20 NOTE — PROGRESS NOTES
Problem: Falls - Risk of  Goal: *Absence of Falls  Description: Document Kobi Farmer Fall Risk and appropriate interventions in the flowsheet.   Outcome: Progressing Towards Goal  Note: Fall Risk Interventions:     Problem: Patient Education: Go to Patient Education Activity  Goal: Patient/Family Education  Outcome: Progressing Towards Goal     Problem: Psychosis  Goal: *STG: Decreased hallucinations  Outcome: Progressing Towards Goal  Goal: *STG: Decreased delusional thinking  Outcome: Progressing Towards Goal  Goal: *STG: Remains safe in hospital  Outcome: Progressing Towards Goal  Goal: *STG: Patient will verbalize areas in need of boundary recognition and limit setting  Outcome: Progressing Towards Goal  Goal: *STG/LTG: Complies with medication therapy  Outcome: Progressing Towards Goal

## 2020-12-20 NOTE — GROUP NOTE
[facility]  GROUP DOCUMENTATION INDIVIDUAL Group Therapy Note Date: 12/20/2020 Group Start Time: 1 Group End Time: 3922 Group Topic: Nursing SRM 2 BEHA [facility] ACUTE Karissa Chaparro LPN IP  GROUP DOCUMENTATION GROUP Group Therapy Note Attendees: 3 Attendance: Attended Patient's Goal:  Increase awareness of Hygiene Interventions/techniques: Reinforced Follows Directions: Followed directions Interactions: Interacted appropriately Mental Status: Calm Behavior/appearance: Cooperative Goals Achieved: Able to listen to others Additional Notes:   
 
Artis Main LPN

## 2020-12-20 NOTE — GROUP NOTE
[facility] GROUP DOCUMENTATION INDIVIDUAL Group Therapy Note Date: 12/20/2020 Group Start Time: 1100 Group End Time: 3356 Group Topic: Process Group - Inpatient SRM 2  NON ACUTE Kvng Vargas [facility] GROUP DOCUMENTATION GROUP Group Therapy Note Process group facilitated and documented by COURTNEY Oliveros. Patients were asked to reflect on their experience here at the hospital, goals for treatment, personal goals for 20201, and their progress thus far. This worker educated the group on the importance of self-care., medication compliance, and support systems. Attendees: 4 out of 7 Attendance: Attended Patient's Goal:  Participates in individual and group therapy Interventions/techniques: Informed, Provide feedback and Supported Follows Directions: Followed directions Interactions: Interacted appropriately Mental Status: Calm, Congruent and Other disorganized thoughts Behavior/appearance: Attentive, Cooperative, Disheveled and Motivated Goals Achieved: Able to engage in interactions, Able to listen to others, Able to reflect/comment on own behavior, Able to receive feedback, Discussed coping and Identified triggers Additional Notes:  Patient attended group with active participation. He shared his goals openly with group members when prompted. He stated, \"I want to get a job, take care of myself, try to give back to others, and get my ID. \"  He presented with disheveled appearance. He reported that learned how to meditate and states he finds meditation helpful. He also reported self care activities like reading, writing, and exercising. Substance abuse hx shared with group; he reported marijuana use in the past and current tobacco use. He seemed to have an understanding how substances affect mental health. Erik Fregoso

## 2020-12-20 NOTE — GROUP NOTE
[facility] GROUP DOCUMENTATION INDIVIDUAL Group Therapy Note Date: 12/20/2020 Group Start Time: 2714 Group End Time: 2718 Group Topic: Education Group - Inpatient SRM 2  NON ACUTE Lori Boswell 
 
[facility] GROUP DOCUMENTATION GROUP Group Therapy Note Facilitated discussion focused on setting goals, why its important and potential problems that may keep someone from completing goals Attendees: 4 Attendance: Attended Patient's Goal:  Attend group daily Interventions/techniques: Informed and Supported Follows Directions: Followed directions Interactions: Interacted appropriately Mental Status: Calm Behavior/appearance: Cooperative Goals Achieved: Able to engage in interactions and Able to listen to others Additional Notes:  Pt was receptive to information discussed and shared his goal is to work as a  in [employer] and  he want to study the probable cause of neurons Kourtney Blair

## 2020-12-20 NOTE — BH NOTES
Behavioral Health Treatment Team Note     Patient goal(s) for today: Discharge and to get an ID so that he can get a Job  Treatment team focus/goals: Discharge planning and treatment participation    Progress note: Patient continues to present disheveled with poor orientation to situation. Patient states that he would like to discharge soon so that he can get an ID and get back to worker. This worker informed the patient that his current ID from Jakob Hoang has  and that his brother has plans to fly to the [facility] and pick him up from the hospital for continued treatment in [place]. The patient was hesitant and stated that he does not want to go with his brother. The patient shared that he would go with his brother reluctantly    LOS:  12  Expected LOS: 13 days    Insurance info/prescription coverage:  No insurance  Date of last family contact:    Family requesting physician contact today:  no  Discharge plan:  Home to [place] with brother  Mirza Rubalcava in the home:  no   Outpatient provider(s):   Will work with patient and brother to identify    Participating treatment team members: Sharonda Feliz, * (assigned SW), Saeid Vivas LMSW

## 2020-12-21 VITALS
TEMPERATURE: 98.1 F | RESPIRATION RATE: 19 BRPM | HEART RATE: 105 BPM | SYSTOLIC BLOOD PRESSURE: 121 MMHG | DIASTOLIC BLOOD PRESSURE: 75 MMHG | WEIGHT: 150 LBS | OXYGEN SATURATION: 100 % | BODY MASS INDEX: 20.32 KG/M2 | HEIGHT: 72 IN

## 2020-12-21 PROCEDURE — 74011250637 HC RX REV CODE- 250/637: Performed by: PSYCHIATRY & NEUROLOGY

## 2020-12-21 RX ORDER — RISPERIDONE 2 MG/1
2 TABLET, FILM COATED ORAL
Qty: 30 TAB | Refills: 0 | Status: SHIPPED | OUTPATIENT
Start: 2020-12-21

## 2020-12-21 RX ORDER — BENZTROPINE MESYLATE 1 MG/1
1 TABLET ORAL
Qty: 60 TAB | Refills: 0 | Status: SHIPPED | OUTPATIENT
Start: 2020-12-21

## 2020-12-21 RX ORDER — ARIPIPRAZOLE 5 MG/1
5 TABLET ORAL DAILY
Qty: 30 TAB | Refills: 0 | Status: SHIPPED | OUTPATIENT
Start: 2020-12-22

## 2020-12-21 RX ORDER — TRAZODONE HYDROCHLORIDE 50 MG/1
50 TABLET ORAL
Qty: 30 TAB | Refills: 0 | Status: SHIPPED | OUTPATIENT
Start: 2020-12-21

## 2020-12-21 RX ORDER — HYDROXYZINE PAMOATE 25 MG/1
25 CAPSULE ORAL
Qty: 60 CAP | Refills: 1 | Status: SHIPPED | OUTPATIENT
Start: 2020-12-21 | End: 2021-01-04

## 2020-12-21 RX ADMIN — ARIPIPRAZOLE 5 MG: 5 TABLET ORAL at 08:48

## 2020-12-21 NOTE — GROUP NOTE
[facility]  GROUP DOCUMENTATION INDIVIDUAL Group Therapy Note Date: 12/21/2020 Group Start Time: 1100 Group End Time: 3995 Group Topic: Education Group - Inpatient [facility] Group Sim LEE  GROUP DOCUMENTATION GROUP Group Therapy Note Attendees: 7/15 Safety Plan Group: Pts were encouraged to rate their mood as a check in question. Pts were then walked through the safety plan template and encouraged to share their warning signs, coping skills, etc. Pts were then encouraged to participate in a breathing exercise and reflect on group Attendance: Did not attend Additional Notes:  Pt came to group for the last few minutes of group. [employer]

## 2020-12-21 NOTE — PROGRESS NOTES
Client presents alert and oriented to person place and time, disheveled and out in milieu. Makes little eye contact when approached by staff and is preoccupied with obtaining ID before he is discharged. He states he just needs ID and does not need his brother to pick him up. Client denies SI/HI/AVH/Depression/Anxiety. Accepted all medications.

## 2020-12-21 NOTE — PROGRESS NOTES
Problem: Falls - Risk of  Goal: *Absence of Falls  Description: Document Janet Aragon Fall Risk and appropriate interventions in the flowsheet.   Outcome: Progressing Towards Goal  Note: Fall Risk Interventions:                                Problem: Patient Education: Go to Patient Education Activity  Goal: Patient/Family Education  Outcome: Progressing Towards Goal     Problem: Psychosis  Goal: *STG: Decreased hallucinations  Outcome: Progressing Towards Goal  Goal: *STG: Remains safe in hospital  Outcome: Progressing Towards Goal  Goal: *STG: Patient will verbalize areas in need of boundary recognition and limit setting  Outcome: Progressing Towards Goal  Goal: *STG: Patient will verbalize issues that get in their way of progress (i.e.: anger, fear etc.)  Outcome: Progressing Towards Goal  Client agrees to seek out staff if he experiences impulses to harm self

## 2020-12-21 NOTE — BH NOTES
SAFETY PLAN    A suicide Safety Plan is a document that supports someone when they are having thoughts of suicide. Warning Signs that indicate a suicidal crisis may be developing: What (situations, thoughts, feelings, body sensations, behaviors, etc.) do you experience that lets you know you are beginning to think about suicide? 1. 'isolate, motivation, holding paid itchy'  2. 'swarve, rigo, scatchy hideful, fullness'  3. 'stuck, haley, astroll'     Internal Coping Strategies:  What things can I do (relaxation techniques, hobbies, physical activities, etc.) to take my mind off my problems without contacting another person? 1. 'taking a walk, being physical'  2. 'open odor'  3. 'sportsmenship'    People and social settings that provide distraction: Who can I call or where can I go to distract me? 1. Name: n/a  Phone: n/a  2. Name: n/a  Phone: n/a   3. Place: n/a            4. Place: n/a    People whom I can ask for help: Who can I call when I need help - for example, friends, family, clergy, someone else? 1. Name: Social Workers                Phone: n/a  2. Name: skills social  Phone: n/a  3. Name: job  Phone: n/a    Professionals or [address] [place] [address] I can contact during a crisis: Who can I call for help - for example, my doctor, my psychiatrist, my psychologist, a mental health provider, a suicide hotline? 1. Clinician Name: [facility]   Phone: [phone]      Clinician Pager or Emergency Contact #: n/a    2. Clinician Name: n/a   Phone: n/a      Clinician Pager or Emergency Contact #: n/a    3. Suicide Prevention Lifeline: 4-607-109-TALK (8696)    4. [address] [place] [address] Emergency Services -  for example, [facility] suicide hotline, [address] [place] [address] Avenue: [facility]      Emergency Services Address: [facility] [address] [place] [address]      Emergency Services Phone: [phone]    Making the environment safe:  How can I make my environment (house/apartment/living space) safer? For example, can I remove guns, medications, and other items? 1. 'yes I can remove medications'  2.  'Yes I can remove items'

## 2020-12-21 NOTE — BH NOTES
This worker spoke with patient's sister maxi who called on behalf of the patient and patient brother. Maxi reported to this worker that the patient was attempting to get out of the car while [place] are on the highway near the [address] [place] [address]. This worker instructed maxi to contact police or crisis through D-19 if they are in an emergency.

## 2020-12-21 NOTE — GROUP NOTE
IP  GROUP DOCUMENTATION INDIVIDUAL Group Therapy Note Date: 12/21/2020 Group Start Time: 1300 Group End Time: 9020 Group Topic: Process Group - Inpatient SRM 2 BH NON ACUTE [facility] GROUP DOCUMENTATION GROUP Group Therapy Note Attendees: 5 out of 14  
 
1pm Combined Process Group Patients were encouraged to discuss their thoughts, feelings, and emotions. Patients were further encouraged to listen to and be supportive of their peers. Attendance: Did not attend Patient's Goal:  N/A Interventions/techniques: Other N/A Follows Directions: Other N/A Interactions: Other N/A Mental Status: Other N/A Behavior/appearance: N/A Goals Achieved: N/A Additional Notes:  Patient did not attend group despite having been encouraged to do so. [place]

## 2020-12-21 NOTE — BH NOTES
Patient seen for follow-up and remains nicotine, polite pleasant he is receptive further no side effect blood pressure 122/75 pulse 81 compliant with medication

## 2020-12-21 NOTE — BH NOTES
DISCHARGE SUMMARY    NAME:Silvino Sykes  : 1995  MRN: [id number]    The patient Chuy Sykes exhibits the ability to control behavior in a less restrictive environment. Patient's level of functioning is improving. No assaultive/destructive behavior has been observed for the past 24 hours. No suicidal/homicidal threat or behavior has been observed for the past 24 hours. There is no evidence of serious medication side effects. Patient has not been in physical or protective restraints for at least the past 24 hours. If weapons involved, how are they secured? No weapons in the home    Is patient aware of and in agreement with discharge plan? Patient agrees to discharge    Arrangements for medication:  Prescriptions given to patient, given a weeks supply or 30 day supply. Copy of discharge instructions to provider?:  Will provide    Arrangements for transportation home:  Patient will be returning to [facility] with his brother who will be driving him. Keep all follow up appointments as scheduled, continue to take prescribed medications per physician instructions.   Mental health crisis number:  [phone] or your local mental health crisis line number at [phone]

## 2020-12-21 NOTE — BH NOTES
Discharge coordination  This worker has coordinated discharge with patient's sister Jona Nogueira. The patient has during his stay gone back and forth about returning to [place], but does not have trusted resources in the [place] area or valid identification. This worker spoke to the patient prior to discharge who again voiced his reservations about discharging with his brother. The patient stated \"Im a man\" this worker believes the patient was expressing independence when making that statement. The patient and this worker discussed using a siblings address to reapply for identification, which is a top priority for the patient. The patient was open to this plan and agreed to discharge with his brother to travel to [facility] for continued treatment and to apply for identification. This worker identified [facility] in [place]. To provide outpatient services and case management for the patient.

## 2020-12-21 NOTE — BH NOTES
Behavioral Health Transition Record to Provider    Patient Name: Bryant Sykes  YOB: 1995  Medical Record Number: [id number]  Date of Admission: 12/7/2020  Date of Discharge: 12/21/2020    Attending Provider: He Stern MD  Discharging Provider: Dr. Monica Lewis  To contact this individual call [phone] and ask the  to page. If unavailable, ask to be transferred to [address] [place] [address] Provider on call. [facility] Provider will be available on call 24/7 and during holidays. Primary Care Provider: None    No Known Allergies    Reason for Admission: Patient was wandering and lacked orientation to situation. Hearing voices. Admission Diagnosis: Schizophrenia ([address] [F20.9]  Schizophrenia ([facility] [address] [F20.9]    * No surgery found *    Results for orders placed or performed during the hospital encounter of 12/07/20   SARS-COV-2, PCR    Specimen: Nasopharyngeal   Result Value Ref Range    Specimen source Nasopharyngeal      SARS-CoV-2 Not detected Not detected   CBC WITH AUTOMATED DIFF   Result Value Ref Range    WBC 4.9 4.1 - 11.1 K/uL    RBC 4.13 4.10 - 5.70 M/uL    HGB 12.8 12.1 - 17.0 g/dL    HCT 38.3 36.6 - 50.3 %    MCV 92.7 80.0 - 99.0 FL    MCH 31.0 26.0 - 34.0 PG    MCHC 33.4 30.0 - 36.5 g/dL    RDW 11.8 11.5 - 14.5 %    PLATELET 197 255 - 150 K/uL    MPV 10.3 8.9 - 12.9 FL    NEUTROPHILS 52 32 - 75 %    LYMPHOCYTES 40 12 - 49 %    MONOCYTES 3 (L) 5 - 13 %    EOSINOPHILS 4 0 - 7 %    BASOPHILS 1 0 - 1 %    IMMATURE GRANULOCYTES 0 0.0 - 0.5 %    ABS. NEUTROPHILS 2.6 1.8 - 8.0 K/UL    ABS. LYMPHOCYTES 2.0 0.8 - 3.5 K/UL    ABS. MONOCYTES 0.2 0.0 - 1.0 K/UL    ABS. EOSINOPHILS 0.2 0.0 - 0.4 K/UL    ABS. BASOPHILS 0.0 0.0 - 0.1 K/UL    ABS. IMM.  GRANS. 0.0 0.00 - 0.04 K/UL    DF AUTOMATED     METABOLIC PANEL, COMPREHENSIVE   Result Value Ref Range    Sodium 142 136 - 145 mmol/L    Potassium 3.8 3.5 - 5.1 mmol/L    Chloride 109 (H) 97 - 108 mmol/L    CO2 28 21 - 32 mmol/L Anion gap 5 5 - 15 mmol/L    Glucose 106 (H) 65 - 100 mg/dL    BUN 13 6 - 20 mg/dL    Creatinine 0.98 0.70 - 1.30 mg/dL    BUN/Creatinine ratio 13 12 - 20      GFR est AA >60 >60 ml/min/1.73m2    GFR est non-AA >60 >60 ml/min/1.73m2    Calcium 8.7 8.5 - 10.1 mg/dL    Bilirubin, total 0.3 0.2 - 1.0 mg/dL    AST (SGOT) 20 15 - 37 U/L    ALT (SGPT) 24 12 - 78 U/L    Alk. phosphatase 93 45 - 117 U/L    Protein, total 7.1 6.4 - 8.2 g/dL    Albumin 3.4 (L) 3.5 - 5.0 g/dL    Globulin 3.7 2.0 - 4.0 g/dL    A-G Ratio 0.9 (L) 1.1 - 2.2     URINALYSIS W/ REFLEX CULTURE    Specimen: Urine   Result Value Ref Range    Color Yellow/Straw      Appearance Clear Clear      Specific gravity 1.030 1.003 - 1.030      pH (UA) 5.0 5.0 - 8.0      Protein Negative Negative mg/dL    Glucose Negative Negative mg/dL    Ketone Negative Negative mg/dL    Bilirubin Negative Negative      Blood Negative Negative      Urobilinogen 0.1 0.1 - 1.0 EU/dL    Nitrites Negative Negative      Leukocyte Esterase Negative Negative      UA:UC IF INDICATED Culture not indicated by UA result Culture not indicated by UA result      WBC 0-4 0 - 4 /hpf    RBC 0-5 0 - 5 /hpf    Bacteria Negative Negative /hpf    Mucus 2+ /lpf    CA Oxalate crystals 1+    DRUG SCREEN, URINE   Result Value Ref Range    AMPHETAMINES Negative Negative      BARBITURATES Negative Negative      BENZODIAZEPINES Negative Negative      COCAINE Negative Negative      METHADONE Negative Negative      OPIATES Negative Negative      PCP(PHENCYCLIDINE) Negative Negative      THC (TH-CANNABINOL) Negative Negative      Drug screen comment        This test is a screen for drugs of abuse in a medical setting only (i.e., they are unconfirmed results and as such must not be used for non-medical purposes, e.g.,employment testing, legal testing). Due to its inherent nature, false positive (FP) and false negative (FN) results may be obtained.  Therefore, if necessary for medical care, recommend confirmation of positive findings by GC/MS. SARS-COV-2   Result Value Ref Range    SARS-CoV-2 Please find results under separate order     SALICYLATE   Result Value Ref Range    Salicylate level 2.4 (L) 2.8 - 20.0 mg/dL    Reported dose date Not available      Reported dose: Not available Units   ACETAMINOPHEN   Result Value Ref Range    Acetaminophen level <10 (L) 10 - 30 ug/mL       Immunizations administered during this encounter: There is no immunization history for the selected administration types on file for this patient. Screening for Metabolic Disorders for Patients on Antipsychotic Medications  (Data obtained from the EMR)    Estimated Body Mass Index  Estimated body mass index is 20.34 kg/m² as calculated from the following:    Height as of this encounter: 6' (1.829 m). Weight as of this encounter: 68 kg (150 lb). Vital Signs/Blood Pressure  Visit Vitals  /75 (BP 1 Location: Right arm, BP Patient Position: Sitting)   Pulse (!) 105   Temp 98.1 °F (36.7 °C)   Resp 19   Ht 6' (1.829 m)   Wt 68 kg (150 lb)   SpO2 100%   BMI 20.34 kg/m²       Blood Glucose/Hemoglobin A1c  Lab Results   Component Value Date/Time    Glucose 106 (H) 12/07/2020 06:00 PM       No results found for: HBA1C, HGBE8, MVP6SQPJ     Lipid Panel  No results found for: CHOL, CHOLX, CHLST, CHOLV, 167184, HDL, HDLP, LDL, LDLC, DLDLP, TGLX, TRIGL, TRIGP, CHHD, CHHDX     Discharge Diagnosis: Schizophrenia    Discharge Plan: Patient will return to [place] with his sister and brother to be connected with mental health resources in the [place] area. Discharge Medication List and Instructions:   Current Discharge Medication List      START taking these medications    Details   benztropine (COGENTIN) 1 mg tablet Take 1 Tab by mouth two (2) times daily as needed (For mild-moderate extrapyramidal symptoms).   Qty: 60 Tab, Refills: 0      hydrOXYzine pamoate (VISTARIL) 25 mg capsule Take 1 Cap by mouth four (4) times daily as needed for Anxiety for up to 14 days. Qty: 60 Cap, Refills: 1      traZODone (DESYREL) 50 mg tablet Take 1 Tab by mouth nightly. Qty: 30 Tab, Refills: 0      ARIPiprazole (ABILIFY) 5 mg tablet Take 1 Tab by mouth daily. Indications: schizophrenia  Qty: 30 Tab, Refills: 0         CONTINUE these medications which have CHANGED    Details   risperiDONE (RisperDAL) 2 mg tablet Take 1 Tab by mouth nightly. Indications: schizophrenia  Qty: 30 Tab, Refills: 0             Unresulted Labs (24h ago, onward)    None        To obtain results of studies pending at discharge, please contact [phone]    Follow-up Information     Follow up With Specialties Details Why Contact Info    [facility]  Call  [address] [facility]   Phone: [phone]  Emergency # [phone] option 1          Advanced Directive:   Does the patient have an appointed surrogate decision maker? No  Does the patient have a Medical Advance Directive? No  Does the patient have a Psychiatric Advance Directive? No  If the patient does not have a surrogate or Medical Advance Directive AND Psychiatric Advance Directive, the patient was offered information on these advance directives Patient will complete at a later time    Patient Instructions: Please continue all medications until otherwise directed by physician. Outpatient provider    Tobacco Cessation Discharge Plan:   Is the patient a smoker and needs referral for smoking cessation? Not applicable  Patient referred to the following for smoking cessation with an appointment? Not applicable     Patient was offered medication to assist with smoking cessation at discharge? Not applicable  Was education for smoking cessation added to the discharge instructions? Not applicable    Alcohol/Substance Abuse Discharge Plan:   Does the patient have a history of substance/alcohol abuse and requires a referral for treatment?  Not applicable  Patient referred to the following for substance/alcohol abuse treatment with an appointment? Not applicable  Patient was offered medication to assist with alcohol cessation at discharge? Not applicable  Was education for substance/alcohol abuse added to discharge instructions?  Not applicable    Patient discharged to Home; discussed with patient/caregiver

## 2020-12-21 NOTE — BH NOTES
Patient is assessed as a low lethality risk. Patient denies any lethality. He understands all discharge instructions, follow up appointments, and discharge medications. Prescriptions were given to patient and explained to take to pharmacy. All belongings were returned. All safe items were returned. Patient motivated and interested and discharge. Patients thought content is clear and appropriate.

## 2021-03-22 NOTE — DISCHARGE SUMMARY
[facility] SUMMARY    Name:  Nando Herndon  MR#:  [id number]  :  1995  ACCOUNT #:  [de-identified]  ADMIT DATE:  2020  DISCHARGE DATE:  2020    Please reference to my initial psychiatric evaluation and treatment plan. HISTORY OF PRESENT ILLNESS:  This is a 70-year-old, single -American male patient admitted to [address] [place] [address] Unit from [facility] ED, [facility] as he was asleep on the street. The patient denies suicidal or homicidal ideation, depression, but he left his parents' house, who live in [place] when he graduated high school and never went back. Denies any and all abuse when he was growing up. Stated that he suffered from boredom. He has been diagnosed with schizophrenia and autism. COURSE AND TREATMENT IN THE HOSPITALIZATION:  The patient was put on close observation, medical consult, participated in psychopharmacological intervention, cooperative. He was resistant to go to groups for a while, but later went. He was able to get track of his family in [place], namely his sister. They are happy to hear that he is here. They want to come and take him. He was somewhat ambivalent about it that he is man and he will makes his own decisions, but nevertheless he left with the sister and the brother. Not suicidal, not homicidal.  No psychosis. Odd, eccentric, and he did cooperate with the medication. He takes medications. DISCHARGE MEDICATIONS:  Risperdal 2 mg one tablet at night, Cogentin 1 mg tablet twice a day, trazodone 50 mg nightly, Abilify 5 mg daily in the morning. LABORATORY DATA:  COVID not detected. Drug screen, negative. Urinalysis unremarkable. Tylenol level 10. Salicylate 2.4. Metabolic panel unremarkable. CBC unremarkable. DISCHARGE DIAGNOSES:  Schizophrenia, schizoaffective disorder, history of autism. Discharge as improved.   Not suicidal, not homicidal, not psychotic. No agitation. No aggression. No guns. Willing to go with his sister and brother to [place] and get followup there.       Adrienne Florian MD      [id number]  D:  03/22/2021 0:01  T:  03/22/2021 13:48  JOB #:  [id number]

## 2022-03-19 PROBLEM — F20.9 SCHIZOPHRENIA (HCC): Status: ACTIVE | Noted: 2020-12-08

## 2022-06-15 NOTE — BH NOTES
Writer attempted to reach out to pts sisters but they did not answer so a voicemail was left Statement Selected

## 2022-09-24 NOTE — BH NOTES
Group Topic: BH Activity Group    Date: 9/24/2022  Start Time: 1600  End Time: 1625  Facilitators: MARCELO Concepcion    Focus: Activity   Number in attendance: 5    Method: Group   Attendance: Present  Participation: Active  Patient Response: Able to return demonstration, Appropriate feedback and Good eye contact  Mood: Normal  Affect: Type: Euthymic (normal mood)   Range: Full (normal)   Congruency: Congruent   Stability: Stable  Behavior/Socialization: Appropriate to group and Cooperative  Thought Process: Focused  Task Performance: Follows directions  Patient Evaluation: Independent - full participation       Patient has been visible on the unit, in the day room on the telephone and watching television. Patient denies depression, SI and HI, AH & VH. Patient states his anxiety is a 4/10. He cooperative and  preoccupied. Patient has pressure  speech and thought block. He denies pain. He's on close observation for safety.

## 2023-05-14 RX ORDER — RISPERIDONE 2 MG/1
2 TABLET ORAL
COMMUNITY
Start: 2020-12-21

## 2023-05-14 RX ORDER — BENZTROPINE MESYLATE 1 MG/1
1 TABLET ORAL 2 TIMES DAILY PRN
COMMUNITY
Start: 2020-12-21

## 2023-05-14 RX ORDER — ARIPIPRAZOLE 5 MG/1
5 TABLET ORAL DAILY
COMMUNITY
Start: 2020-12-22

## 2023-05-14 RX ORDER — TRAZODONE HYDROCHLORIDE 50 MG/1
50 TABLET ORAL
COMMUNITY
Start: 2020-12-21

## 2024-06-25 NOTE — BH NOTES
Patient requesting refill on     Requested Prescriptions     Pending Prescriptions Disp Refills    levocetirizine (XYZAL) 5 MG tablet 90 tablet 0     Sig: Take 1 tablet by mouth daily        Last OV 4/17/2024    Pt generally in room during the evening remaining to self. Pleasant on approach, flat affect brightens on approach. Remained in room awake even when called for snack. Pt hygiene poor and encouraged to shower and stated will later, Pt did not. Medication compliant andd no negative effects from medications observed or reported.